# Patient Record
Sex: FEMALE | Race: WHITE | NOT HISPANIC OR LATINO | Employment: OTHER | URBAN - NONMETROPOLITAN AREA
[De-identification: names, ages, dates, MRNs, and addresses within clinical notes are randomized per-mention and may not be internally consistent; named-entity substitution may affect disease eponyms.]

---

## 2017-03-06 ENCOUNTER — COMMUNICATION - GICH (OUTPATIENT)
Dept: FAMILY MEDICINE | Facility: OTHER | Age: 82
End: 2017-03-06

## 2017-06-12 ENCOUNTER — COMMUNICATION - GICH (OUTPATIENT)
Dept: FAMILY MEDICINE | Facility: OTHER | Age: 82
End: 2017-06-12

## 2017-06-12 DIAGNOSIS — E78.5 HYPERLIPIDEMIA: ICD-10-CM

## 2017-06-13 ENCOUNTER — AMBULATORY - GICH (OUTPATIENT)
Dept: LAB | Facility: OTHER | Age: 82
End: 2017-06-13

## 2017-06-13 DIAGNOSIS — E78.5 HYPERLIPIDEMIA: ICD-10-CM

## 2017-06-13 LAB
A/G RATIO - HISTORICAL: 1.2 (ref 1–2)
ALBUMIN SERPL-MCNC: 4.1 G/DL (ref 3.5–5.7)
ALP SERPL-CCNC: 69 IU/L (ref 34–104)
ALT (SGPT) - HISTORICAL: 11 IU/L (ref 7–52)
ANION GAP - HISTORICAL: 8 (ref 5–18)
AST SERPL-CCNC: 18 IU/L (ref 13–39)
BILIRUB SERPL-MCNC: 0.6 MG/DL (ref 0.3–1)
BUN SERPL-MCNC: 17 MG/DL (ref 7–25)
BUN/CREAT RATIO - HISTORICAL: 20
CALCIUM SERPL-MCNC: 10 MG/DL (ref 8.6–10.3)
CHLORIDE SERPLBLD-SCNC: 100 MMOL/L (ref 98–107)
CHOL/HDL RATIO - HISTORICAL: 6.5
CHOLESTEROL TOTAL: 299 MG/DL
CO2 SERPL-SCNC: 27 MMOL/L (ref 21–31)
CREAT SERPL-MCNC: 0.85 MG/DL (ref 0.7–1.3)
GFR IF NOT AFRICAN AMERICAN - HISTORICAL: >60 ML/MIN/1.73M2
GLOBULIN - HISTORICAL: 3.4 G/DL (ref 2–3.7)
GLUCOSE SERPL-MCNC: 118 MG/DL (ref 70–105)
HDLC SERPL-MCNC: 46 MG/DL (ref 23–92)
LDLC SERPL CALC-MCNC: 224 MG/DL
NON-HDL CHOLESTEROL - HISTORICAL: 253 MG/DL
PATIENT STATUS - HISTORICAL: ABNORMAL
POTASSIUM SERPL-SCNC: 3.4 MMOL/L (ref 3.5–5.1)
PROT SERPL-MCNC: 7.5 G/DL (ref 6.4–8.9)
SODIUM SERPL-SCNC: 135 MMOL/L (ref 133–143)
TRIGL SERPL-MCNC: 147 MG/DL

## 2017-07-03 ENCOUNTER — COMMUNICATION - GICH (OUTPATIENT)
Dept: FAMILY MEDICINE | Facility: OTHER | Age: 82
End: 2017-07-03

## 2017-07-03 DIAGNOSIS — I10 ESSENTIAL (PRIMARY) HYPERTENSION: ICD-10-CM

## 2017-07-18 ENCOUNTER — COMMUNICATION - GICH (OUTPATIENT)
Dept: FAMILY MEDICINE | Facility: OTHER | Age: 82
End: 2017-07-18

## 2017-07-18 ENCOUNTER — OFFICE VISIT - GICH (OUTPATIENT)
Dept: FAMILY MEDICINE | Facility: OTHER | Age: 82
End: 2017-07-18

## 2017-07-18 ENCOUNTER — HISTORY (OUTPATIENT)
Dept: FAMILY MEDICINE | Facility: OTHER | Age: 82
End: 2017-07-18

## 2017-07-18 DIAGNOSIS — E78.5 HYPERLIPIDEMIA: ICD-10-CM

## 2017-07-18 DIAGNOSIS — I10 ESSENTIAL (PRIMARY) HYPERTENSION: ICD-10-CM

## 2017-09-28 ENCOUNTER — COMMUNICATION - GICH (OUTPATIENT)
Dept: FAMILY MEDICINE | Facility: OTHER | Age: 82
End: 2017-09-28

## 2017-09-28 DIAGNOSIS — E78.5 HYPERLIPIDEMIA: ICD-10-CM

## 2017-09-29 ENCOUNTER — AMBULATORY - GICH (OUTPATIENT)
Dept: LAB | Facility: OTHER | Age: 82
End: 2017-09-29

## 2017-09-29 DIAGNOSIS — E78.5 HYPERLIPIDEMIA: ICD-10-CM

## 2017-09-29 LAB
CHOL/HDL RATIO - HISTORICAL: 4.59
CHOLESTEROL TOTAL: 211 MG/DL
HDLC SERPL-MCNC: 46 MG/DL (ref 23–92)
LDLC SERPL CALC-MCNC: 134 MG/DL
NON-HDL CHOLESTEROL - HISTORICAL: 165 MG/DL
PROVIDER ORDERDED STATUS - HISTORICAL: ABNORMAL
TRIGL SERPL-MCNC: 155 MG/DL

## 2017-10-06 ENCOUNTER — OFFICE VISIT - GICH (OUTPATIENT)
Dept: FAMILY MEDICINE | Facility: OTHER | Age: 82
End: 2017-10-06

## 2017-10-06 ENCOUNTER — HISTORY (OUTPATIENT)
Dept: FAMILY MEDICINE | Facility: OTHER | Age: 82
End: 2017-10-06

## 2017-10-06 DIAGNOSIS — I10 ESSENTIAL (PRIMARY) HYPERTENSION: ICD-10-CM

## 2017-10-06 DIAGNOSIS — Z23 ENCOUNTER FOR IMMUNIZATION: ICD-10-CM

## 2017-12-28 NOTE — PROGRESS NOTES
Patient Information     Patient Name MRN Sex Leighann Hidalgo [id number] Female 1929      Progress Notes by Tomi Trimble MD at 2017  9:45 AM     Author:  Tomi Trimble MD Service:  (none) Author Type:  Physician     Filed:  2017  9:57 AM Encounter Date:  2017 Status:  Signed     :  Tomi Trimble MD (Physician)            SUBJECTIVE:  Leighann Girard is a 88 y.o. female here for follow-up. Patient has a history of hypertension which has been well-controlled. She recently had fasting labs done which were normal.    She also has a history of hyperlipidemia. She has not tolerated Lipitor in the past. She states that she has several family members that you simvastatin and he tolerated well. She would like to try that given their success. She's had no chest pain, short of breath or palpitations. She tries to exercise but has had difficult time recently due to the humidity. She otherwise has no new concerns.      Patient Active Problem List      Diagnosis Date Noted     ACP (advance care planning) 2013     LEG CRAMPS, NOCTURNAL 2011     OSTEOPENIA      HYPERTENSION      HYPERLIPIDEMIA      ALLERGIC RHINITIS      ANEMIA 2009       Past Medical History:     Diagnosis  Date     Encounter for monitoring estrogen replacement therapy following surgical menopause 1967    stopped estrogen replacement .      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  0      "Flaxseed Powd Take 1 tablespoon by mouth with breakfast.       fluticasone (50 mcg per actuation) nasal solution (FLONASE) USE TWO SPRAY(S) IN EACH NOSTRIL ONCE DAILY 3 Bottle 1     gemfibrozil (LOPID) 600 mg tablet Take 1 tablet by mouth 2 times daily before meals. 180 tablet 3     guaiFENesin (MUCINEX) 600 mg Extended-Release tablet One tab once or twice daily as needed for congestion and phlegm 20 tablet 0     losartan-hydrochlorothiazide, 50-12.5 mg, (HYZAAR) 50-12.5 mg tablet Take 1 tablet by mouth once daily. 90 tablet 3     multivitamin (MVI) tablet Take 1 tablet by mouth once daily.       OMEGA-3 FATTY ACIDS (FISH OIL CONCENTRATE ORAL) Take 1 capsule by mouth 2 times daily.       simvastatin (ZOCOR) 10 mg tablet Take 1 tablet by mouth at bedtime. 90 tablet 3     ZINC ORAL Take 1 capsule by mouth once daily.       No current facility-administered medications for this visit.      Medications have been reviewed by me and are current to the best of my knowledge and ability.      Allergies:  Allergies      Allergen   Reactions     Ace Inhibitors  Cough     Lescol [Fluvastatin]  Other - Describe In Comment Field     Elevated liver enzymes      Shrimp [Shellfish Containing Products]  Shortness Of Breath     Statins-Hmg-Coa Reductase Inhibitors  Myalgia     Tessalon [Benzonatate]  Nausea Only and Dizziness       Family History       Problem   Relation Age of Onset     Hyperlipidemia  Sister       Hyperlipidemia       Heart Disease  Sister      MI in her 60's, lifelong smoker       Stroke  Father 89     Heart Disease  Mother      Heart attack in her late 80's,          Social History     Substance Use Topics       Smoking status: Never Smoker     Smokeless tobacco: Never Used     Alcohol use No       ROS:    As above otherwise ROS is unremarkable.      OBJECTIVE:  /68  Pulse 72  Ht 1.6 m (5' 3\")  Wt 61.2 kg (135 lb)  BMI 23.91 kg/m2    EXAM:  General Appearance: Pleasant, alert, appropriate appearance " for age. No acute distress  Neck: Supple, no masses or nodes, no lymphadenopathy.  No thyromegaly.  Lungs: Normal chest wall and respirations. Clear to auscultation, no wheezes or crackles.  Cardiovascular: Regular rate and rhythm. S1, S2, no murmurs.  Musculoskeletal: No edema.  Skin: no concerning or new rashes.  Neurologic Exam: CN 2-12 grossly intact.  Normal gait.  Symmetric DTRs, No focal motor or sensory deficits. No tremor.  Psychiatric Exam: Alert and oriented, appropriate affect.    ASSESSEMENT AND PLAN:    Leighann was seen today for follow up.    Diagnoses and all orders for this visit:    Hyperlipidemia, unspecified hyperlipidemia type  -     simvastatin (ZOCOR) 10 mg tablet; Take 1 tablet by mouth at bedtime.    HYPERTENSION    HTN (hypertension)  -     losartan-hydrochlorothiazide, 50-12.5 mg, (HYZAAR) 50-12.5 mg tablet; Take 1 tablet by mouth once daily.    We'll start simvastatin 10 mg daily. She'll follow up sometime in October for fasting labs. Discussed potential side effects including myalgias once again.    We'll also continue her blood pressure regimen, recent fasting labs are reviewed and are normal. She'll follow-up in one year for reassessment of her blood pressure.      Lenny Trimble MD

## 2017-12-28 NOTE — PROGRESS NOTES
"Patient Information     Patient Name MRN Sex Leighann Hidalgo [id number] Female 1929      Progress Notes by Tomi Trimble MD at 10/6/2017  9:45 AM     Author:  Tomi Trimble MD Service:  (none) Author Type:  Physician     Filed:  10/6/2017 10:38 AM Encounter Date:  10/6/2017 Status:  Signed     :  Tomi Trimble MD (Physician)            SUBJECTIVE:  Leighann Girard is a 88 y.o. female here for follow-up. Patient has a history of hypertension and hyperlipidemia. We started simvastatin on her recently and she's been doing well with that. She has had no side effects. She recently had fasting lipids which have shown significant improvement. Her blood pressure has been stable. She is otherwise felt well with no exertional cardiovascular symptoms.    Allergies:  Allergies      Allergen   Reactions     Ace Inhibitors  Cough     Lescol [Fluvastatin]  Other - Describe In Comment Field     Elevated liver enzymes      Shrimp [Shellfish Containing Products]  Shortness Of Breath     Statins-Hmg-Coa Reductase Inhibitors  Myalgia     Tessalon [Benzonatate]  Nausea Only and Dizziness       ROS:    As above otherwise ROS is unremarkable.    OBJECTIVE:  /68  Pulse 69  Ht 1.6 m (5' 3\")  Wt 62.3 kg (137 lb 6.4 oz)  BMI 24.34 kg/m2    EXAM:  General Appearance: Pleasant, alert, appropriate appearance for age. No acute distress  Lungs: Normal chest wall and respirations. Clear to auscultation, no wheezes or crackles.  Cardiovascular: Regular rate and rhythm. S1, S2, no murmurs.  Musculoskeletal: No edema.    Results for orders placed or performed in visit on 17      LIPID PANEL      Result  Value Ref Range    CHOLESTEROL,TOTAL 211 (H) <200 mg/dL    TRIGLYCERIDES 155 (H) <150 mg/dL    HDL CHOLESTEROL 46 23 - 92 mg/dL    NON-HDL CHOLESTEROL 165 (H) <145 mg/dl    CHOL/HDL RATIO            4.59 (H) <4.50                    LDL CHOLESTEROL 134 (H) <100 mg/dL    PROVIDER ORDERED STATUS RANDOM         ASSESSEMENT " AND PLAN:    Leighann was seen today for medication management.    Diagnoses and all orders for this visit:    HTN (hypertension)  -     losartan-hydrochlorothiazide, 50-12.5 mg, (HYZAAR) 50-12.5 mg tablet; Take 1 tablet by mouth once daily.    Needs flu shot  -     FLU VACCINE => 65 YRS HIGH DOSE TRIVALENT IIV3 IM     blood pressure well controlled, continue current regimen. Reviewed lipids which are seen above. Overall these are much improved with simvastatin. She'll continue with fish oil to help with triglycerides as well as dietary changes. She'll follow-up in one year.      Lenny Trimble MD    This document was prepared using voice generated software.  While every attempt was made for accuracy, grammatical errors may exist.

## 2017-12-28 NOTE — TELEPHONE ENCOUNTER
Patient Information     Patient Name MRN Sex Leighann Hidalgo [id number] Female 1929      Telephone Encounter by Tomi Trimble MD at 2017  3:09 PM     Author:  Tomi Trimble MD Service:  (none) Author Type:  Physician     Filed:  2017  3:09 PM Encounter Date:  2017 Status:  Signed     :  Tomi Trimble MD (Physician)            Fasting lab ordered

## 2017-12-28 NOTE — TELEPHONE ENCOUNTER
Patient Information     Patient Name Leighann Caceres [id number] Female 1929      Telephone Encounter by Goldie Jade at 2017  2:34 PM     Author:  Goldie Jade  Service:  (none) Author Type:  (none)     Filed:  2017  4:42 PM  Encounter Date:  2017 Status:  Addendum     :  Goldie Jade        Related Notes: Original Note by Goldie Jade filed at 2017  2:35 PM            Patient would like to have an order to check her cholesterol.  Goldie Jade LPN .............2017  2:34 PM

## 2017-12-28 NOTE — TELEPHONE ENCOUNTER
Patient Information     Patient Name MRLeighann Gee [id number] Female 1929      Telephone Encounter by Goldie Jade at 2017  1:07 PM     Author:  Goldie Jade Service:  (none) Author Type:  (none)     Filed:  2017  1:07 PM Encounter Date:  2017 Status:  Signed     :  Goldie Jade            Pharmacy notified.  Goldie Jade LPN .............2017  1:07 PM

## 2017-12-28 NOTE — TELEPHONE ENCOUNTER
Patient Information     Patient Name Leighann Caceres [id number] Female 1929      Telephone Encounter by Goldie Jade at 2017 11:50 AM     Author:  Goldie Jade Service:  (none) Author Type:  (none)     Filed:  2017 11:54 AM Encounter Date:  2017 Status:  Signed     :  Goldie Jade            Spoke with pharmacy and they need to verify that simvastatin is okay to prescribe the patient due to her statin allergy. Pharmacy states they just need verification.  Goldie Jade LPN .............2017  11:54 AM

## 2017-12-28 NOTE — TELEPHONE ENCOUNTER
Patient Information     Patient Name MRN Sex Leighann Hidalgo [id number] Female 1929      Telephone Encounter by Tomi Trimble MD at 2017 12:16 PM     Author:  Tomi Trimble MD Service:  (none) Author Type:  Physician     Filed:  2017 12:16 PM Encounter Date:  2017 Status:  Signed     :  Tomi Trimble MD (Physician)            Yes, the patient requested to try simvastatin as the rest of her family takes it without any problems.

## 2017-12-28 NOTE — TELEPHONE ENCOUNTER
Patient Information     Patient Name MRN Sex Leighann Hidalgo [id number] Female 1929      Telephone Encounter by Tomi Trimble MD at 2017  2:40 PM     Author:  Tomi Trimble MD Service:  (none) Author Type:  Physician     Filed:  2017  2:41 PM Encounter Date:  2017 Status:  Signed     :  Tomi Trimble MD (Physician)            Fasting labs ordered.

## 2017-12-28 NOTE — TELEPHONE ENCOUNTER
Patient Information     Patient Name MRN Leighann Bright [id number] Female 1929      Telephone Encounter by Roland Lee RN at 2017  2:47 PM     Author:  Roland Lee RN Service:  (none) Author Type:  NURS- Registered Nurse     Filed:  2017  2:55 PM Encounter Date:  7/3/2017 Status:  Signed     :  Roland Lee RN (NURS- Registered Nurse)            Diuretic Combinations    Office visit in the past 12 months or per provider note.    Last visit with KERRY LÓPEZ was on: 2016 in [facility]  Next visit with KERRY LÓPEZ is on: No future appointment listed with this provider  Next visit with Family Practice is on: No future appointment listed in this department    Lab test requirements:  Creatinine and Potassium annually, if ordering lab, order BMP.  CREATININE (mg/dL)    Date Value   2017 0.85     POTASSIUM (mmol/L)    Date Value   2017 3.4 (L)     Max refill for 12 months from last office visit or per provider note.    Chart review shows that last office visit with PCP to discuss diagnosis of hypertension was on 9/10/15 for an annual physical. Hyzaar as requested from pharmacy was continued at that time as per office visit notes on that date. Patient with labwork as noted above completed recently to support continued use of requested rx, but hasn't seen PCP in last 12 months. Writer will refill rx as requested for a limited supply, and send patient a reminder letter that she should be seen for an annual office visit with PCP.    Prescription refilled per RN Medication Refill Policy.................... Roland Lee RN ....................  2017   2:51 PM

## 2017-12-28 NOTE — TELEPHONE ENCOUNTER
Patient Information     Patient Name MRN Leighann Bright [id number] Female 1929      Telephone Encounter by Goldie Jade at 2017  3:29 PM     Author:  Goldie Jade Service:  (none) Author Type:  (none)     Filed:  2017  3:30 PM Encounter Date:  2017 Status:  Signed     :  Goldie Jade            Patient notified and transferred to scheduling line.  Goldie Jade LPN .............2017  3:30 PM

## 2017-12-28 NOTE — TELEPHONE ENCOUNTER
Patient Information     Patient Name MRN Leighann Bright [id number] Female 1929      Telephone Encounter by Goldie Jade at 2017  4:43 PM     Author:  Goldie Jade Service:  (none) Author Type:  (none)     Filed:  2017  4:44 PM Encounter Date:  2017 Status:  Signed     :  Goldie Jade            Patient notified, transferred to appointment line to schedule.  Goldie Jade LPN .............2017  4:43 PM

## 2017-12-30 NOTE — NURSING NOTE
Patient Information     Patient Name MRLeighann Gee [id number] Female 1929      Nursing Note by Goldie Jade at 10/6/2017  9:45 AM     Author:  Goldie Jade Service:  (none) Author Type:  (none)     Filed:  10/6/2017 10:09 AM Encounter Date:  10/6/2017 Status:  Signed     :  Goldie Jade            Patient presents today for medication management. Patient states she has no concerns and is only her for her blood pressure medications.  Goldie Jade LPN .............10/6/2017  10:03 AM

## 2017-12-30 NOTE — NURSING NOTE
Patient Information     Patient Name Leighann Caceres [id number] Female 1929      Nursing Note by Goldie Jade at 2017  9:45 AM     Author:  Goldie Jade Service:  (none) Author Type:  (none)     Filed:  2017  9:46 AM Encounter Date:  2017 Status:  Signed     :  Goldie Jade            Patient presents today to follow up on her cholesterol. Patient also states she has other issues to address; patient stopped taking her Lipitor due to muscle weakness which alleviated those symptoms; patient received a letter stating she needs to discuss her BP medication before receiving another refill.  Goldie Jade LPN .............2017  9:41 AM

## 2018-01-03 NOTE — TELEPHONE ENCOUNTER
"Patient Information     Patient Name MRN Leighann Bright [id number] Female 1929      Telephone Encounter by Lucina Belcher at 3/6/2017  2:53 PM     Author:  Lucina Belcher Service:  (none) Author Type:  (none)     Filed:  3/6/2017  2:57 PM Encounter Date:  3/6/2017 Status:  Signed     :  Lucina Belcher            Patient requesting an Rx for a cane. States she \"doesn't 'need' one but does go out for walks and thinks that it would be a good idea. Informed for medicare to pay for DME she would need to be seen. States that she will price them out of pocket and if does not want to pay out of pocket will make an appointment to discuss.  Lucina Belcher LPN   3/6/2017  2:57 PM           "

## 2018-01-18 PROBLEM — J30.9 ALLERGIC RHINITIS: Status: ACTIVE | Noted: 2018-01-18

## 2018-01-18 PROBLEM — M94.9 DISORDER OF BONE AND CARTILAGE: Status: ACTIVE | Noted: 2018-01-18

## 2018-01-18 PROBLEM — I10 HYPERTENSION: Status: ACTIVE | Noted: 2018-01-18

## 2018-01-18 PROBLEM — E78.5 HYPERLIPIDEMIA: Status: ACTIVE | Noted: 2018-01-18

## 2018-01-18 PROBLEM — M89.9 DISORDER OF BONE AND CARTILAGE: Status: ACTIVE | Noted: 2018-01-18

## 2018-01-18 RX ORDER — DIPHENOXYLATE HYDROCHLORIDE AND ATROPINE SULFATE 2.5; .025 MG/1; MG/1
TABLET ORAL
COMMUNITY
End: 2019-09-09

## 2018-01-18 RX ORDER — ACETAMINOPHEN AND DIPHENHYDRAMINE HCL 500; 25 MG/1; MG/1
TABLET, FILM COATED ORAL
COMMUNITY
End: 2021-11-08

## 2018-01-18 RX ORDER — FLUTICASONE PROPIONATE 50 MCG
SPRAY, SUSPENSION (ML) NASAL
COMMUNITY
Start: 2016-04-26 | End: 2018-11-09

## 2018-01-18 RX ORDER — CHLORAL HYDRATE 500 MG
CAPSULE ORAL
COMMUNITY
End: 2021-11-08

## 2018-01-18 RX ORDER — SIMVASTATIN 10 MG
TABLET ORAL
COMMUNITY
Start: 2017-07-18 | End: 2018-08-30

## 2018-01-18 RX ORDER — LOSARTAN POTASSIUM AND HYDROCHLOROTHIAZIDE 12.5; 5 MG/1; MG/1
TABLET ORAL
COMMUNITY
Start: 2017-10-06 | End: 2018-10-12

## 2018-01-26 VITALS
HEIGHT: 63 IN | HEART RATE: 72 BPM | SYSTOLIC BLOOD PRESSURE: 132 MMHG | DIASTOLIC BLOOD PRESSURE: 68 MMHG | WEIGHT: 135 LBS | BODY MASS INDEX: 23.92 KG/M2

## 2018-01-26 VITALS
HEART RATE: 69 BPM | WEIGHT: 137.4 LBS | HEIGHT: 63 IN | BODY MASS INDEX: 24.34 KG/M2 | DIASTOLIC BLOOD PRESSURE: 68 MMHG | SYSTOLIC BLOOD PRESSURE: 123 MMHG

## 2018-02-02 ENCOUNTER — HISTORY (OUTPATIENT)
Dept: FAMILY MEDICINE | Facility: OTHER | Age: 83
End: 2018-02-02

## 2018-02-02 ENCOUNTER — OFFICE VISIT - GICH (OUTPATIENT)
Dept: FAMILY MEDICINE | Facility: OTHER | Age: 83
End: 2018-02-02

## 2018-02-02 DIAGNOSIS — R06.01 ORTHOPNEA: ICD-10-CM

## 2018-02-02 DIAGNOSIS — R06.00 DYSPNEA: ICD-10-CM

## 2018-02-02 LAB
A/G RATIO - HISTORICAL: 1.4 (ref 1–2)
ALBUMIN SERPL-MCNC: 4.4 G/DL (ref 3.5–5.7)
ALP SERPL-CCNC: 58 IU/L (ref 34–104)
ALT (SGPT) - HISTORICAL: 19 IU/L (ref 7–52)
ANION GAP - HISTORICAL: 7 (ref 5–18)
AST SERPL-CCNC: 25 IU/L (ref 13–39)
BILIRUB SERPL-MCNC: 0.5 MG/DL (ref 0.3–1)
BNP SERPL-MCNC: 33 PG/ML
BUN SERPL-MCNC: 17 MG/DL (ref 7–25)
BUN/CREAT RATIO - HISTORICAL: 20
CALCIUM SERPL-MCNC: 10 MG/DL (ref 8.6–10.3)
CHLORIDE SERPLBLD-SCNC: 101 MMOL/L (ref 98–107)
CO2 SERPL-SCNC: 32 MMOL/L (ref 21–31)
CREAT SERPL-MCNC: 0.86 MG/DL (ref 0.7–1.3)
GFR IF NOT AFRICAN AMERICAN - HISTORICAL: >60 ML/MIN/1.73M2
GLOBULIN - HISTORICAL: 3.2 G/DL (ref 2–3.7)
GLUCOSE SERPL-MCNC: 137 MG/DL (ref 70–105)
POTASSIUM SERPL-SCNC: 3.4 MMOL/L (ref 3.5–5.1)
PROT SERPL-MCNC: 7.6 G/DL (ref 6.4–8.9)
SODIUM SERPL-SCNC: 140 MMOL/L (ref 133–143)

## 2018-02-06 ENCOUNTER — TRANSFERRED RECORDS (OUTPATIENT)
Dept: HEALTH INFORMATION MANAGEMENT | Facility: CLINIC | Age: 83
End: 2018-02-06

## 2018-02-06 ENCOUNTER — MEDICAL CORRESPONDENCE (OUTPATIENT)
Facility: CLINIC | Age: 83
End: 2018-02-06
Payer: COMMERCIAL

## 2018-02-06 ENCOUNTER — HOSPITAL ENCOUNTER (OUTPATIENT)
Dept: RADIOLOGY | Facility: OTHER | Age: 83
End: 2018-02-06
Attending: FAMILY MEDICINE

## 2018-02-06 DIAGNOSIS — R06.00 DYSPNEA: ICD-10-CM

## 2018-02-06 DIAGNOSIS — R06.01 ORTHOPNEA: ICD-10-CM

## 2018-02-06 PROCEDURE — 93306 TTE W/DOPPLER COMPLETE: CPT | Mod: 26 | Performed by: INTERNAL MEDICINE

## 2018-02-09 ENCOUNTER — OFFICE VISIT - GICH (OUTPATIENT)
Dept: FAMILY MEDICINE | Facility: OTHER | Age: 83
End: 2018-02-09

## 2018-02-09 ENCOUNTER — HISTORY (OUTPATIENT)
Dept: FAMILY MEDICINE | Facility: OTHER | Age: 83
End: 2018-02-09

## 2018-02-09 VITALS
HEART RATE: 101 BPM | BODY MASS INDEX: 23.91 KG/M2 | WEIGHT: 135 LBS | SYSTOLIC BLOOD PRESSURE: 123 MMHG | DIASTOLIC BLOOD PRESSURE: 72 MMHG

## 2018-02-09 DIAGNOSIS — R06.01 ORTHOPNEA: ICD-10-CM

## 2018-02-12 VITALS
BODY MASS INDEX: 23.91 KG/M2 | DIASTOLIC BLOOD PRESSURE: 78 MMHG | WEIGHT: 135 LBS | HEART RATE: 96 BPM | SYSTOLIC BLOOD PRESSURE: 126 MMHG

## 2018-02-13 NOTE — PROGRESS NOTES
Patient Information     Patient Name MRN Sex Leighann Hidalgo [id number] Female 1929      Progress Notes by Tomi Trimble MD at 2018  2:30 PM     Author:  Tomi Trimble MD Service:  (none) Author Type:  Physician     Filed:  2018  2:49 PM Encounter Date:  2018 Status:  Signed     :  Tomi Trimble MD (Physician)            SUBJECTIVE:  Leighann Girard is a 89 y.o. female here for difficulty sleeping. She states that over the last couple of weeks she has had increasing shortness of breath when she lays down. Because of that she's had to sleep with 2 or 3 pillows which is making it difficult to sleep. She does not have any difficulty during the day. She is able to nap during the day while she is in a chair seated. She does notsignificant water retention or edema in her lower extremity. No change in salt intake. No chest pain or palpitations. No change in medications.    Allergies:  Allergies      Allergen   Reactions     Ace Inhibitors  Cough     Lescol [Fluvastatin]  Other - Describe In Comment Field     Elevated liver enzymes      Shrimp [Shellfish Containing Products]  Shortness Of Breath     Statins-Hmg-Coa Reductase Inhibitors  Myalgia     Tessalon [Benzonatate]  Nausea Only and Dizziness       ROS:    As above otherwise ROS is unremarkable.    OBJECTIVE:  /72 (Cuff Site: Right Arm, Position: Sitting, Cuff Size: Adult Regular)  Pulse (!) 101  Wt 61.2 kg (135 lb)  BMI 23.91 kg/m2    EXAM:  General Appearance: Pleasant, alert, appropriate appearance for age. No acute distress  Lungs: Normal chest wall and respirations. Clear to auscultation, no wheezes or crackles.  Cardiovascular: Regular rate and rhythm. S1, S2, no murmurs.  Musculoskeletal: No edema.    ASSESSEMENT AND PLAN:    Leighann was seen today for sleep problem.    Diagnoses and all orders for this visit:    Orthopnea  -     COMPLETE METABOLIC PANEL; Future  -     BNP; Future  -     CBC WITH DIFFERENTIAL; Future  -     ECHO  COMPLETE WO CONTRAST; Future  -     EKG 12 LEAD UNIT PERFORMED (PERFORMED TODAY)    Dyspnea, unspecified type   -     ECHO COMPLETE WO CONTRAST; Future  -     EKG 12 LEAD UNIT PERFORMED (PERFORMED TODAY)     EKG is performed, personally reviewed and shows sinus rhythm with inferior changes previously seen. She also has a first-degree AV block.  No other acute abnormalities.  We'll get an echocardiogram to evaluate for any signs of heart failure, basic metabolic panel, BNP and CBC. She'll follow-up several days after echocardiogram to review all of these results. Discussed trying to minimize salt in her diet as best she can.      Lenny Trimble MD    This document was prepared using voice generated software.  While every attempt was made for accuracy, grammatical errors may exist.

## 2018-02-13 NOTE — NURSING NOTE
Patient Information     Patient Name MRN Leighann Bright [id number] Female 1929      Nursing Note by Goldie Jade at 2018  4:00 PM     Author:  Goldie Jade Service:  (none) Author Type:  (none)     Filed:  2018  4:22 PM Encounter Date:  2018 Status:  Signed     :  Goldie Jade            Patient presents today to review her recent ECHO and labs.  Goldie Jade LPN .............2018  4:09 PM

## 2018-02-13 NOTE — PROGRESS NOTES
Patient Information     Patient Name MRN Sex Leighann Hidalgo [id number] Female 1929      Progress Notes by Tomi Trimble MD at 2018  4:00 PM     Author:  Tomi Trimble MD Service:  (none) Author Type:  Physician     Filed:  2018  4:31 PM Encounter Date:  2018 Status:  Signed     :  Tomi Trimble MD (Physician)            SUBJECTIVE:  Leighann Girard is a 89 y.o. female here for follow-up. She was seen last week for orthopnea. At that time she is requiring 2 pillows to sleep at night. Since that time she tried some antihistamines and thinks that that is helping quite a bit. She's been able to sleep with only 1 pill at bedtime. Because of her symptoms she had an echocardiogram, labs and EKG performed. She comes in today for follow-up of those.    Allergies:  Allergies      Allergen   Reactions     Ace Inhibitors  Cough     Lescol [Fluvastatin]  Other - Describe In Comment Field     Elevated liver enzymes      Shrimp [Shellfish Containing Products]  Shortness Of Breath     Statins-Hmg-Coa Reductase Inhibitors  Myalgia     Tessalon [Benzonatate]  Nausea Only and Dizziness       ROS:    As above otherwise ROS is unremarkable.    OBJECTIVE:  /78 (Cuff Site: Right Arm, Position: Sitting, Cuff Size: Adult Regular)  Pulse 96  Wt 61.2 kg (135 lb)  BMI 23.91 kg/m2    EXAM:  General Appearance: Pleasant, alert, appropriate appearance for age. No acute distress      ASSESSEMENT AND PLAN:    Leighann was seen today for follow up.    Diagnoses and all orders for this visit:    Orthopnea     we reviewed her echocardiogram results which shows mild diastolic dysfunction otherwise unremarkable. Her labs are normal including normal BNP. Since she does have some improvement with that is to be the recommended she continue with that. She has worsening symptoms we could consider a small iritic to see if that would be beneficial but we will hold off at this time. Both the patient and her son were comfortable  with this plan and she will follow-up as needed.      Lenny Trimble MD    This document was prepared using voice generated software.  While every attempt was made for accuracy, grammatical errors may exist.

## 2018-02-13 NOTE — NURSING NOTE
"Patient Information     Patient Name Leighann Caceres [id number] Female 1929      Nursing Note by Goldie Jade at 2018  2:30 PM     Author:  Goldie Jade Service:  (none) Author Type:  (none)     Filed:  2018  2:34 PM Encounter Date:  2018 Status:  Signed     :  Goldie Jade            Patient presents today with some sleep concerns. She states that she has been having some difficulty catching her breath and it worsens at night to the point of where she can't sleep at night. She states she has no issues sitting down during the day and falling asleep, but at night \"it's a different story.\"  Goldie Jade LPN .............2018  2:21 PM          "

## 2018-04-24 ENCOUNTER — TELEPHONE (OUTPATIENT)
Dept: FAMILY MEDICINE | Facility: OTHER | Age: 83
End: 2018-04-24

## 2018-04-24 DIAGNOSIS — E78.00 PURE HYPERCHOLESTEROLEMIA: Primary | ICD-10-CM

## 2018-04-24 NOTE — TELEPHONE ENCOUNTER
After birth date was verified, spoke with Leighann. She stated that she would like to have her cholesterol and triglycerides rechecked. States that she has been taking Simvastatin since July and has not had follow up labs.     If appropriate, please order labs and patient will schedule a lab only appointment.        Ben Orozco LPN 04/24/18 3:06 PM

## 2018-04-25 NOTE — TELEPHONE ENCOUNTER
Called patient with results after giving last name and date of birth.  Leif Robertson ..............4/25/2018 2:25 PM

## 2018-04-27 DIAGNOSIS — E78.00 PURE HYPERCHOLESTEROLEMIA: ICD-10-CM

## 2018-04-27 LAB
CHOLEST SERPL-MCNC: 187 MG/DL
HDLC SERPL-MCNC: 43 MG/DL (ref 23–92)
LDLC SERPL CALC-MCNC: 114 MG/DL
NONHDLC SERPL-MCNC: 144 MG/DL
TRIGL SERPL-MCNC: 151 MG/DL

## 2018-04-27 PROCEDURE — 36415 COLL VENOUS BLD VENIPUNCTURE: CPT | Performed by: FAMILY MEDICINE

## 2018-04-27 PROCEDURE — 80061 LIPID PANEL: CPT | Performed by: FAMILY MEDICINE

## 2018-04-30 ENCOUNTER — OFFICE VISIT (OUTPATIENT)
Dept: FAMILY MEDICINE | Facility: OTHER | Age: 83
End: 2018-04-30
Attending: FAMILY MEDICINE
Payer: COMMERCIAL

## 2018-04-30 VITALS
BODY MASS INDEX: 24.84 KG/M2 | SYSTOLIC BLOOD PRESSURE: 132 MMHG | DIASTOLIC BLOOD PRESSURE: 62 MMHG | HEART RATE: 80 BPM | WEIGHT: 140.2 LBS

## 2018-04-30 DIAGNOSIS — S86.911A STRAIN OF RIGHT KNEE AND LEG, INITIAL ENCOUNTER: Primary | ICD-10-CM

## 2018-04-30 PROCEDURE — G0463 HOSPITAL OUTPT CLINIC VISIT: HCPCS

## 2018-04-30 PROCEDURE — 99213 OFFICE O/P EST LOW 20 MIN: CPT | Performed by: FAMILY MEDICINE

## 2018-04-30 ASSESSMENT — PAIN SCALES - GENERAL: PAINLEVEL: SEVERE PAIN (7)

## 2018-04-30 NOTE — NURSING NOTE
Patient here for on going right thigh pain. Has had this in the past. Not sure what causing it it currently   Heather Dang LPN ..........4/30/2018 2:30 PM

## 2018-04-30 NOTE — MR AVS SNAPSHOT
"              After Visit Summary   2018    Leighann Girard    MRN: [id number]           Patient Information     Date Of Birth          1929        Visit Information        Provider Department      2018 2:45 PM Yessica Meehan MD [facility]        Today's Diagnoses     Strain of right knee and leg, initial encounter    -  1       Follow-ups after your visit        Who to contact     If you have questions or need follow up information about today's clinic visit or your schedule please contact [facility] directly at [phone].  Normal or non-critical lab and imaging results will be communicated to you by PeerJhart, letter or phone within 4 business days after the clinic has received the results. If you do not hear from us within 7 days, please contact the clinic through [employer] or phone. If you have a critical or abnormal lab result, we will notify you by phone as soon as possible.  Submit refill requests through [employer] or call your pharmacy and they will forward the refill request to us. Please allow 3 business days for your refill to be completed.          Additional Information About Your Visit        MyChart Information     [employer] lets you send messages to your doctor, view your test results, renew your prescriptions, schedule appointments and more. To sign up, go to [email or web address] . Click on \"Log in\" on the left side of the screen, which will take you to the Welcome page. Then click on \"Sign up Now\" on the right side of the page.     You will be asked to enter the access code listed below, as well as some personal information. Please follow the directions to create your username and password.     Your access code is: [email or web address]  Expires: 2018  7:24 PM     Your access code will  in 90 days. If you need help or a new code, please call your [place] clinic or [phone].        Care EveryWhere ID     This is your Care EveryWhere ID. This " could be used by other organizations to access your [place] medical records  [id number]        Your Vitals Were     Pulse BMI (Body Mass Index)                80 24.84 kg/m2           Blood Pressure from Last 3 Encounters:   04/30/18 132/62   02/09/18 126/78   02/02/18 123/72    Weight from Last 3 Encounters:   04/30/18 140 lb 3.2 oz (63.6 kg)   02/09/18 135 lb (61.2 kg)   02/02/18 135 lb (61.2 kg)              Today, you had the following     No orders found for display       Primary Care Provider Office Phone # Fax #    Tomi Trimble [phone] [address] [facility] [address]        Equal Access to Services     NANCY CARLIN : Marcel gómez Soyanni, waaxda luqadaha, qaybta kaalmada adeargentinayadaisy, karley de la paz . So [facility] [phone].    ATENCIÓN: Si habla español, tiene a zeng disposición servicios gratuitos de asistencia lingüística. Llame al [phone].    We comply with applicable federal civil rights laws and [place] laws. We do not discriminate on the basis of race, color, national origin, age, disability, sex, sexual orientation, or gender identity.            Thank you!     Thank you for choosing [facility]  for your care. Our goal is always to provide you with excellent care. Hearing back from our patients is one way we can continue to improve our services. Please take a few minutes to complete the written survey that you may receive in the mail after your visit with us. Thank you!             Your Updated Medication List - Protect others around you: Learn how to safely use, store and throw away your medicines at www.disposemymeds.org.          This list is accurate as of 4/30/18  7:24 PM.  Always use your most recent med list.                   Brand Name Dispense Instructions for use Diagnosis    Calcium Citrate-Vitamin D 500-500 MG-UNT/5GM Powd           EQL NATURAL ZINC 50 MG Tabs           fluticasone 50  MCG/ACT spray    FLONASE          losartan-hydrochlorothiazide 50-12.5 MG per tablet    HYZAAR          MULTI-VITAMINS Tabs           Omega-3 1000 MG Caps           simvastatin 10 MG tablet    ZOCOR

## 2018-05-01 NOTE — PROGRESS NOTES
SUBJECTIVE:   Leighann Girard is a 89 year old female who presents to clinic today for the following health issues:    HPI Comments: Leighann is an 89-year-old woman who presents today with approximately 5 days of right medial thigh pain.  No known injury.  Symptoms do not bother her when she is sitting or lying, or even when she is standing.  But when she tries to walk she has pain in the area of the right mid medial thigh.  No bruising in this area.  She has tried some Tylenol without relief.  She has a history of sciatica/hip pain with radiation down into her feet, but this last bothered her about 10 years ago.  She is very active, typically walks around her apartment building.  She no longer drives, but navigates the local bus system.       Patient Active Problem List    Diagnosis Date Noted     Allergic rhinitis 01/18/2018     Priority: Medium     Hyperlipidemia 01/18/2018     Priority: Medium     Hypertension 01/18/2018     Priority: Medium     Disorder of bone and cartilage 01/18/2018     Priority: Medium     ACP (advance care planning) 12/12/2013     Priority: Medium     Cramp of limb 08/08/2011     Priority: Medium     Anemia 04/23/2009     Priority: Medium         Review of Systems see HPI, review of systems is otherwise negative.    OBJECTIVE:     /62 (BP Location: Right arm, Patient Position: Sitting, Cuff Size: Adult Regular)  Pulse 80  Wt 140 lb 3.2 oz (63.6 kg)  BMI 24.84 kg/m2  Body mass index is 24.84 kg/(m^2).  Physical Exam   Constitutional: She appears well-developed and well-nourished.   HENT:   Right Ear: External ear normal.   Left Ear: External ear normal.   Eyes: Conjunctivae are normal. No scleral icterus.   Cardiovascular: Normal rate.    Pulmonary/Chest: Effort normal. No respiratory distress.   Musculoskeletal: She exhibits no edema.   No swelling of the lower extremities.  No pain with deep palpation of the right upper leg.  No pain with internal or external rotation of the hip.   Normal strength.   Neurological: She is alert.   Skin: No rash noted.   Psychiatric: She has a normal mood and affect.     ASSESSMENT/PLAN:         ICD-10-CM    1. Strain of right knee and leg, initial encounter S86.911A      Suspect muscle or ligament strain, no evidence of bone pathology necessitating ridging via x-ray.  Would recommend proceeding with conservative management.  Patient has not found Tylenol to be helpful.  Previously with normal creatinine.  Therefore, did recommend a short course of ibuprofen 400 mg every 6 hours for the next 2-3 days.  She will only use this when wanting to be active, as symptoms do not bother her when she is at rest.  Did review risks associated with this medication.  She can try ice and/or heat to see if this relieves any symptoms.  Did recommend overall decreased activity while allowing for healing.  If no improvement in symptoms, would recommend physical therapy.  Follow-up with Dr. Trimble for further evaluation if symptoms persist.    Yessica Meehan MD  [facility]

## 2018-06-01 ENCOUNTER — TELEPHONE (OUTPATIENT)
Dept: FAMILY MEDICINE | Facility: OTHER | Age: 83
End: 2018-06-01

## 2018-06-01 DIAGNOSIS — H91.93 DECREASED HEARING OF BOTH EARS: Primary | ICD-10-CM

## 2018-06-09 ENCOUNTER — TRANSFERRED RECORDS (OUTPATIENT)
Dept: HEALTH INFORMATION MANAGEMENT | Facility: OTHER | Age: 83
End: 2018-06-09

## 2018-07-23 NOTE — PROGRESS NOTES
Patient Information     Patient Name  Leighann Girard MRN  [id number] Sex  Female   1929      Letter by Tomi Trimble MD at      Author:  Tomi Trimble MD Service:  (none) Author Type:  (none)    Filed:   Encounter Date:  2017 Status:  (Other)           Leighann Girard  [address]  [address]  [facility] [address]          2017    Dear Ms. Girard:    Your recent lab values can be seen below.     Your cholesterol panel continues to be elevated. Generally we would use cholesterol-lowering medication however you have had muscle aches from these in the past. Therefore I would recommend that you continue to try to moderate your diet.    Your fasting glucose, which is a test for diabetes, came back acceptable. Your liver and kidney tests also came back normal.    If you have any questions, do not hesitate to contact me.    Results for orders placed or performed in visit on 17      COMPLETE METABOLIC PANEL      Result  Value Ref Range    SODIUM 135 133 - 143 mmol/L    POTASSIUM 3.4 (L) 3.5 - 5.1 mmol/L    CHLORIDE 100 98 - 107 mmol/L    CO2,TOTAL 27 21 - 31 mmol/L    ANION GAP 8 5 - 18                    GLUCOSE 118 (H) 70 - 105 mg/dL    CALCIUM 10.0 8.6 - 10.3 mg/dL    BUN 17 7 - 25 mg/dL    CREATININE 0.85 0.70 - 1.30 mg/dL    BUN/CREAT RATIO           20                    GFR if African American >60 >60 ml/min/1.73m2    GFR if not African American >60 >60 ml/min/1.73m2    ALBUMIN 4.1 3.5 - 5.7 g/dL    PROTEIN,TOTAL 7.5 6.4 - 8.9 g/dL    GLOBULIN                  3.4 2.0 - 3.7 g/dL    A/G RATIO 1.2 1.0 - 2.0                    BILIRUBIN,TOTAL 0.6 0.3 - 1.0 mg/dL    ALK PHOSPHATASE 69 34 - 104 IU/L    ALT (SGPT) 11 7 - 52 IU/L    AST (SGOT) 18 13 - 39 IU/L   LIPID PANEL      Result  Value Ref Range    CHOLESTEROL,TOTAL 299 (H) <200 mg/dL    TRIGLYCERIDES 147 <150 mg/dL    HDL CHOLESTEROL 46 23 - 92 mg/dL    NON-HDL CHOLESTEROL 253 (H) <145 mg/dl    CHOL/HDL RATIO            6.50 (H) <4.50                     LDL CHOLESTEROL 224 (H) <100 mg/dL    PATIENT STATUS            FASTING                         Sincerely,        Lenny Trimble MD  Family Medicine

## 2018-07-23 NOTE — PROGRESS NOTES
Patient Information     Patient Name  Leighann Girard MRN  [id number] Sex  Female   1929      Letter by Tomi Trimble MD at      Author:  Tomi Trimble MD Service:  (none) Author Type:  (none)    Filed:   Encounter Date:  2017 Status:  (Other)           Leighann Girard  [address]  [address]  [place] [address]          2017    Dear Ms. Girard:    Your recent lab values can be seen below.     Your cholesterol panel shows significant improvement now that you have started simvastatin. I would recommend that you continue with this medication though you check this again in one year.    If you have any questions, do not hesitate to contact me.    Results for orders placed or performed in visit on 17      LIPID PANEL      Result  Value Ref Range    CHOLESTEROL,TOTAL 211 (H) <200 mg/dL    TRIGLYCERIDES 155 (H) <150 mg/dL    HDL CHOLESTEROL 46 23 - 92 mg/dL    NON-HDL CHOLESTEROL 165 (H) <145 mg/dl    CHOL/HDL RATIO            4.59 (H) <4.50                    LDL CHOLESTEROL 134 (H) <100 mg/dL    PROVIDER ORDERED STATUS RANDOM          Sincerely,        Lenny Trimble MD  Family Medicine

## 2018-07-24 NOTE — PROGRESS NOTES
Patient Information     Patient Name  Leighann Girard MRN  [id number] Sex  Female   1929      Letter by Tomi Trimble MD at      Author:  Tomi Trimble MD Service:  (none) Author Type:  (none)    Filed:   Encounter Date:  7/3/2017 Status:  (Other)           Leighann Girard  [address]  [address]  [facility] [place] [address]          2017    Dear Ms. Girard:    This is to remind you that you are due for your annual medication management appointment with Tomi Trimble MD in relation to diagnosis of Hypertension as well as continued use of hyzaar. Your last visit was on 9/10/15. Additional refills of your medication require you to complete this visit.    Please call [phone] to schedule your appointment.    Thank you for choosing [facility] for your health care needs.    Sincerely,      Refill RN  [facility]

## 2018-07-24 NOTE — PROGRESS NOTES
Patient Information     Patient Name  Leighann Girard MRN  [id number] Sex  Female   1929      Letter by Tomi Trimble MD at      Author:  Tomi Trimble MD Service:  (none) Author Type:  (none)    Filed:   Encounter Date:  2018 Status:  (Other)           Leighann Girard  [address] [address]          2018    Dear Ms. Girard:    Your recent lab values can be seen below.     Your kidney function, electrolytes and testing for heart failure all came back normal. This is reassuring. We will discuss further at your follow-up visit on .    If you have any questions, do not hesitate to contact me.    Results for orders placed or performed in visit on 18      COMPLETE METABOLIC PANEL      Result  Value Ref Range    SODIUM 140 133 - 143 mmol/L    POTASSIUM 3.4 (L) 3.5 - 5.1 mmol/L    CHLORIDE 101 98 - 107 mmol/L    CO2,TOTAL 32 (H) 21 - 31 mmol/L    ANION GAP 7 5 - 18                    GLUCOSE 137 (H) 70 - 105 mg/dL    CALCIUM 10.0 8.6 - 10.3 mg/dL    BUN 17 7 - 25 mg/dL    CREATININE 0.86 0.70 - 1.30 mg/dL    BUN/CREAT RATIO           20                    GFR if African American >60 >60 ml/min/1.73m2    GFR if not African American >60 >60 ml/min/1.73m2    ALBUMIN 4.4 3.5 - 5.7 g/dL    PROTEIN,TOTAL 7.6 6.4 - 8.9 g/dL    GLOBULIN                  3.2 2.0 - 3.7 g/dL    A/G RATIO 1.4 1.0 - 2.0                    BILIRUBIN,TOTAL 0.5 0.3 - 1.0 mg/dL    ALK PHOSPHATASE 58 34 - 104 IU/L    ALT (SGPT) 19 7 - 52 IU/L    AST (SGOT) 25 13 - 39 IU/L   BNP      Result  Value Ref Range    BRAIN RODRIGUE PEPTIDE GIH 33 <100 pg/mL         Sincerely,        Lenny Trimble MD  Family Medicine

## 2018-08-30 DIAGNOSIS — E78.00 PURE HYPERCHOLESTEROLEMIA: Primary | ICD-10-CM

## 2018-08-31 RX ORDER — SIMVASTATIN 10 MG
TABLET ORAL
Qty: 90 TABLET | Refills: 3 | Status: SHIPPED | OUTPATIENT
Start: 2018-08-31 | End: 2018-11-09

## 2018-10-12 DIAGNOSIS — I10 ESSENTIAL (PRIMARY) HYPERTENSION: Primary | ICD-10-CM

## 2018-10-12 RX ORDER — LOSARTAN POTASSIUM AND HYDROCHLOROTHIAZIDE 12.5; 5 MG/1; MG/1
1 TABLET ORAL DAILY
Qty: 30 TABLET | Refills: 0 | Status: SHIPPED | OUTPATIENT
Start: 2018-10-12 | End: 2018-11-09

## 2018-10-12 NOTE — TELEPHONE ENCOUNTER
[facility] called requesting Refill on rx Losartan/HCT 50-12.5 MG tab, first sent on 10/6. No request found. Writer will initiate refill encounter.    Shalini Crump RN .............. 10/12/2018  7:50 AM

## 2018-10-12 NOTE — TELEPHONE ENCOUNTER
[facility] is requesting for the following:     losartan-hydrochlorothiazide (HYZAAR) 50-12.5 MG per tablet  Take 1 tablet by mouth daily  Last Written Prescription Date:  10/6/17  Last Fill Quantity: 90,   # refills: 3    Last Office Visit: 4/30/18 (annual Px was 10/6/17)  Future Office visit: None.  Angiotensin-II Receptors Nptnkh83/12 7:49 AM   Normal serum potassium on file in past 12 months     Patient due for annual exam and labs. CMP lab pending MD co-sign. Called and spoke to Patient after verifying last name and date of birth. Patient adds that she should have enough to get through all of next week. She was notified of this information and transferred to scheduling:    Next 5 appointments (look out 90 days)     Nov 09, 2018  8:30 AM CST   Office Visit with Tomi Trimble MD   [facility]    [address] [facility] [phone]                Appointment noted added in regards to needed lab for refill purposes. Prescription approved per [facility] for 30 days at this time to get Patient through until appointment.  Shalini Crump RN .............. 10/12/2018  8:56 AM

## 2018-11-09 ENCOUNTER — OFFICE VISIT (OUTPATIENT)
Dept: FAMILY MEDICINE | Facility: OTHER | Age: 83
End: 2018-11-09
Attending: FAMILY MEDICINE
Payer: MEDICARE

## 2018-11-09 VITALS
BODY MASS INDEX: 24.34 KG/M2 | DIASTOLIC BLOOD PRESSURE: 60 MMHG | HEART RATE: 80 BPM | WEIGHT: 137.4 LBS | SYSTOLIC BLOOD PRESSURE: 138 MMHG

## 2018-11-09 DIAGNOSIS — I10 ESSENTIAL (PRIMARY) HYPERTENSION: Primary | ICD-10-CM

## 2018-11-09 DIAGNOSIS — E78.00 PURE HYPERCHOLESTEROLEMIA: ICD-10-CM

## 2018-11-09 PROBLEM — M89.9 DISORDER OF BONE AND CARTILAGE: Status: RESOLVED | Noted: 2018-01-18 | Resolved: 2018-11-09

## 2018-11-09 PROBLEM — M94.9 DISORDER OF BONE AND CARTILAGE: Status: RESOLVED | Noted: 2018-01-18 | Resolved: 2018-11-09

## 2018-11-09 LAB
ALBUMIN SERPL-MCNC: 4.2 G/DL (ref 3.5–5.7)
ALP SERPL-CCNC: 61 U/L (ref 34–104)
ALT SERPL W P-5'-P-CCNC: 20 U/L (ref 7–52)
ANION GAP SERPL CALCULATED.3IONS-SCNC: 6 MMOL/L (ref 3–14)
AST SERPL W P-5'-P-CCNC: 23 U/L (ref 13–39)
BILIRUB SERPL-MCNC: 0.5 MG/DL (ref 0.3–1)
BUN SERPL-MCNC: 14 MG/DL (ref 7–25)
CALCIUM SERPL-MCNC: 9.6 MG/DL (ref 8.6–10.3)
CHLORIDE SERPL-SCNC: 100 MMOL/L (ref 98–107)
CHOLEST SERPL-MCNC: 225 MG/DL
CO2 SERPL-SCNC: 31 MMOL/L (ref 21–31)
CREAT SERPL-MCNC: 0.83 MG/DL (ref 0.6–1.2)
GFR SERPL CREATININE-BSD FRML MDRD: 65 ML/MIN/1.7M2
GLUCOSE SERPL-MCNC: 127 MG/DL (ref 70–105)
HDLC SERPL-MCNC: 44 MG/DL (ref 23–92)
LDLC SERPL CALC-MCNC: 144 MG/DL
NONHDLC SERPL-MCNC: 181 MG/DL
POTASSIUM SERPL-SCNC: 3.4 MMOL/L (ref 3.5–5.1)
PROT SERPL-MCNC: 7.3 G/DL (ref 6.4–8.9)
SODIUM SERPL-SCNC: 137 MMOL/L (ref 134–144)
TRIGL SERPL-MCNC: 186 MG/DL

## 2018-11-09 PROCEDURE — 36415 COLL VENOUS BLD VENIPUNCTURE: CPT | Performed by: FAMILY MEDICINE

## 2018-11-09 PROCEDURE — 80061 LIPID PANEL: CPT | Performed by: FAMILY MEDICINE

## 2018-11-09 PROCEDURE — 80053 COMPREHEN METABOLIC PANEL: CPT | Performed by: FAMILY MEDICINE

## 2018-11-09 PROCEDURE — G0463 HOSPITAL OUTPT CLINIC VISIT: HCPCS

## 2018-11-09 PROCEDURE — 99213 OFFICE O/P EST LOW 20 MIN: CPT | Performed by: FAMILY MEDICINE

## 2018-11-09 RX ORDER — LOSARTAN POTASSIUM AND HYDROCHLOROTHIAZIDE 12.5; 5 MG/1; MG/1
1 TABLET ORAL DAILY
Qty: 90 TABLET | Refills: 3 | Status: SHIPPED | OUTPATIENT
Start: 2018-11-09 | End: 2019-11-16

## 2018-11-09 RX ORDER — SIMVASTATIN 10 MG
10 TABLET ORAL AT BEDTIME
Qty: 90 TABLET | Refills: 3 | Status: SHIPPED | OUTPATIENT
Start: 2018-11-09 | End: 2018-11-20

## 2018-11-09 ASSESSMENT — PAIN SCALES - GENERAL: PAINLEVEL: NO PAIN (0)

## 2018-11-09 NOTE — MR AVS SNAPSHOT
After Visit Summary   11/9/2018    Leighann Girard    MRN: [id number]           Patient Information     Date Of Birth          1/9/1929        Visit Information        Provider Department      11/9/2018 8:30 AM Tomi Trimble MD [facility]        Today's Diagnoses     Essential (primary) hypertension    -  1    Pure hypercholesterolemia           Follow-ups after your visit        Who to contact     If you have questions or need follow up information about today's clinic visit or your schedule please contact [facility] directly at [phone].  Normal or non-critical lab and imaging results will be communicated to you by MyChart, letter or phone within 4 business days after the clinic has received the results. If you do not hear from us within 7 days, please contact the clinic through MyChart or phone. If you have a critical or abnormal lab result, we will notify you by phone as soon as possible.  Submit refill requests through [employer] or call your pharmacy and they will forward the refill request to us. Please allow 3 business days for your refill to be completed.          Additional Information About Your Visit        Care EveryWhere ID     This is your Care EveryWhere ID. This could be used by other organizations to access your [place] medical records  [id number]        Your Vitals Were     Pulse Breastfeeding? BMI (Body Mass Index)             80 No 24.34 kg/m2          Blood Pressure from Last 3 Encounters:   11/09/18 138/60   04/30/18 132/62   02/09/18 126/78    Weight from Last 3 Encounters:   11/09/18 137 lb 6.4 oz (62.3 kg)   04/30/18 140 lb 3.2 oz (63.6 kg)   02/09/18 135 lb (61.2 kg)              We Performed the Following     Comprehensive metabolic panel     Lipid Profile          Today's Medication Changes          These changes are accurate as of 11/9/18  8:48 AM.  If you have any questions, ask your nurse or doctor.               These medicines have changed  or have updated prescriptions.        Dose/Directions    simvastatin 10 MG tablet   Commonly known as:  ZOCOR   This may have changed:  See the new instructions.   Used for:  Pure hypercholesterolemia   Changed by:  Tomi Trimble MD        Dose:  10 mg   Take 1 tablet (10 mg) by mouth At Bedtime   Quantity:  90 tablet   Refills:  3            Where to get your medicines      These medications were sent to [facility] - [place], [place] - [address], [facility] [address]     Phone:  [phone]     losartan-hydrochlorothiazide 50-12.5 MG per tablet    simvastatin 10 MG tablet                Primary Care Provider Office Phone # Fax #    Tomi Trimble [phone]       [address] [facility] [address]        Equal Access to Services     NANCY CARLIN AH: Hadii doreen pryor hadasho Soomaali, waaxda luqadaha, qaybta kaalmada adeegyada, karley de la paz . So [facility] [phone].    ATENCIÓN: Si habla español, tiene a zeng disposición servicios gratuitos de asistencia lingüística. LlThe [facility] [phone].    We comply with applicable federal civil rights laws and [place] laws. We do not discriminate on the basis of race, color, national origin, age, disability, sex, sexual orientation, or gender identity.            Thank you!     Thank you for choosing [facility]  for your care. Our goal is always to provide you with excellent care. Hearing back from our patients is one way we can continue to improve our services. Please take a few minutes to complete the written survey that you may receive in the mail after your visit with us. Thank you!             Your Updated Medication List - Protect others around you: Learn how to safely use, store and throw away your medicines at www.disposemymeds.org.          This list is accurate as of 11/9/18  8:48 AM.  Always use your most recent med list.                   Brand Name  Dispense Instructions for use Diagnosis    Calcium Citrate-Vitamin D 500-500 MG-UNT/5GM Powd           EQL NATURAL ZINC 50 MG Tabs           losartan-hydrochlorothiazide 50-12.5 MG per tablet    HYZAAR    90 tablet    Take 1 tablet by mouth daily    Essential (primary) hypertension       MULTI-VITAMINS Tabs           Omega-3 1000 MG capsule           simvastatin 10 MG tablet    ZOCOR    90 tablet    Take 1 tablet (10 mg) by mouth At Bedtime    Pure hypercholesterolemia

## 2018-11-09 NOTE — PROGRESS NOTES
SUBJECTIVE:  Leighann Girard is a 89 year old female here for annual follow up.  She has a history of HTN and hyperlipidemia.  She has been tolerating her medication well without any side effects.  She has had a increase in her cough over the last week.  She reports that she has a chronic cough.  She has had no phlegm change.  No fevers or chills.  No shortness of breath.  She is otherwise doing well and has no other concerns today.      Patient Active Problem List    Diagnosis Date Noted     Allergic rhinitis 01/18/2018     Priority: Medium     Pure hypercholesterolemia 01/18/2018     Priority: Medium     Essential (primary) hypertension 01/18/2018     Priority: Medium     ACP (advance care planning) 12/12/2013     Priority: Medium     Cramp of limb 08/08/2011     Priority: Medium     Anemia 04/23/2009     Priority: Medium       Past Medical History:   Diagnosis Date     Hormone replacement therapy (postmenopausal)     1967,stopped estrogen replacement 11/03.     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       Past Surgical History:   Procedure Laterality Date     APPENDECTOMY OPEN      1967,Incidental     COLONOSCOPY      2009     ESOPHAGOSCOPY, GASTROSCOPY, DUODENOSCOPY (EGD), COMBINED      2009     HYSTERECTOMY TOTAL ABDOMINAL      1967,Estrogen replacement therapy until 2003     OTHER SURGICAL HISTORY      1987,[id number],BIOPSY BREAST,Right, benign       Current Outpatient Prescriptions   Medication Sig Dispense Refill     Calcium Citrate-Vitamin D 500-500 MG-UNT/5GM POWD        EQL NATURAL ZINC 50 MG TABS        losartan-hydrochlorothiazide (HYZAAR) 50-12.5 MG per tablet Take 1 tablet by mouth daily 90 tablet 3     Multiple Vitamin (MULTI-VITAMINS) TABS        Omega-3 1000 MG CAPS        simvastatin (ZOCOR) 10 MG tablet Take 1 tablet (10 mg) by mouth At Bedtime 90 tablet 3     [DISCONTINUED] losartan-hydrochlorothiazide (HYZAAR)  50-12.5 MG per tablet Take 1 tablet by mouth daily 30 tablet 0     [DISCONTINUED] simvastatin (ZOCOR) 10 MG tablet TAKE ONE TABLET BY MOUTH AT BEDTIME 90 tablet 3       Allergies:  Allergies   Allergen Reactions     Shellfish-Derived Products Shortness Of Breath     Ace Inhibitors Cough     Benzonatate Nausea     Other reaction(s): Dizziness     Fluvastatin Other (See Comments)     Elevated liver enzymes  Elevated liver enzymes     Hmg-Coa-R Inhibitors Muscle Pain (Myalgia)       Family History   Problem Relation Age of Onset     HEART DISEASE Mother      Heart Disease,Heart attack in her late 80's,      Other - See Comments Father 89     Stroke     Hyperlipidemia Sister      Hyperlipidemia, Hyperlipidemia     HEART DISEASE Sister      Heart Disease,MI in her 60's, lifelong smoker       Social History   Substance Use Topics     Smoking status: Never Smoker     Smokeless tobacco: Never Used     Alcohol use No       ROS:    As above otherwise ROS is unremarkable.      OBJECTIVE:  /60  Pulse 80  Wt 137 lb 6.4 oz (62.3 kg)  Breastfeeding? No  BMI 24.34 kg/m2    EXAM:  General Appearance: Pleasant, alert, appropriate appearance for age. No acute distress  Head: Normal. Normocephalic, atraumatic.  Eyes: PERRL, EOMI  Ears: Normal TM's bilaterally. Normal auditory canals and external ears.  Bilateral hearing aids.  OroPharynx: Dental hygiene adequate. Normal buccal mucosa. Normal pharynx.  Neck: Supple, no masses or nodes, no lymphadenopathy.  No thyromegaly.  Lungs: Normal chest wall and respirations. Clear to auscultation, no wheezes or crackles.  Cardiovascular: Regular rate and rhythm. S1, S2, no murmurs.  Musculoskeletal: No edema.  Skin: no concerning or new rashes.  Neurologic Exam: CN 2-12 grossly intact.  Normal gait.  Symmetric DTRs, No focal motor or sensory deficits. No tremor.  Psychiatric Exam: Alert and oriented, appropriate affect.    ASSESSEMENT AND PLAN:    1. Essential (primary) hypertension     2. Pure hypercholesterolemia      We will update fasting labs today.  Her medications were refilled for another year.  She is up-to-date on immunizations.    Lenny Trimble MD  Family Medicine      This document was prepared using voice generated software.  While every attempt was made for accuracy, grammatical errors may exist.

## 2018-11-09 NOTE — LETTER
November 9, 2018      Leighann Girard  [address]   [place] [address]        Dear ,    We are writing to inform you of your test results.    Your diabetes screening with fasting glucose and cholesterol panel are both elevated.  These are not high enough to warrant medication changes but I would recommend trying to improve your diet and try to get daily exercise.    Your liver and kidney testing came back normal.    Resulted Orders   Lipid Profile   Result Value Ref Range    Cholesterol 225 (H) <200 mg/dL    Triglycerides 186 (H) <150 mg/dL      Comment:      Borderline high:  150-199 mg/dl  High:             200-499 mg/dl  Very high:       >499 mg/dl      HDL Cholesterol 44 23 - 92 mg/dL    LDL Cholesterol Calculated 144 (H) <100 mg/dL      Comment:      Above desirable:  100-129 mg/dl  Borderline High:  130-159 mg/dL  High:             160-189 mg/dL  Very high:       >189 mg/dl      Non HDL Cholesterol 181 (H) <130 mg/dL      Comment:      Above Desirable:  130-159 mg/dl  Borderline high:  160-189 mg/dl  High:             190-219 mg/dl  Very high:       >219 mg/dl     Comprehensive metabolic panel   Result Value Ref Range    Sodium 137 134 - 144 mmol/L    Potassium 3.4 (L) 3.5 - 5.1 mmol/L    Chloride 100 98 - 107 mmol/L    Carbon Dioxide 31 21 - 31 mmol/L    Anion Gap 6 3 - 14 mmol/L    Glucose 127 (H) 70 - 105 mg/dL    Urea Nitrogen 14 7 - 25 mg/dL    Creatinine 0.83 0.60 - 1.20 mg/dL    GFR Estimate 65 >60 mL/min/1.7m2    GFR Estimate If Black 78 >60 mL/min/1.7m2    Calcium 9.6 8.6 - 10.3 mg/dL    Bilirubin Total 0.5 0.3 - 1.0 mg/dL    Albumin 4.2 3.5 - 5.7 g/dL    Protein Total 7.3 6.4 - 8.9 g/dL    Alkaline Phosphatase 61 34 - 104 U/L    ALT 20 7 - 52 U/L    AST 23 13 - 39 U/L       If you have any questions or concerns, please call the clinic at the number listed above.       Sincerely,        Tomi Trimble MD

## 2018-11-09 NOTE — NURSING NOTE
Patient presents today for annual medication renewal. She would also like to get fasting labs today.     Silvia Gaston LPN on 11/9/2018 at 8:23 AM

## 2018-11-20 ENCOUNTER — TELEPHONE (OUTPATIENT)
Dept: FAMILY MEDICINE | Facility: OTHER | Age: 83
End: 2018-11-20

## 2018-11-20 DIAGNOSIS — E78.00 PURE HYPERCHOLESTEROLEMIA: Primary | ICD-10-CM

## 2018-11-20 RX ORDER — SIMVASTATIN 20 MG
20 TABLET ORAL AT BEDTIME
Qty: 30 TABLET | Refills: 2 | Status: CANCELLED | OUTPATIENT
Start: 2018-11-20

## 2018-11-20 RX ORDER — SIMVASTATIN 20 MG
20 TABLET ORAL AT BEDTIME
Qty: 90 TABLET | Refills: 3 | Status: SHIPPED | OUTPATIENT
Start: 2018-11-20 | End: 2020-02-21

## 2018-11-20 NOTE — TELEPHONE ENCOUNTER
Based on her age I would recommend we start with 10 mg which may in fact not be strong enough but we should at least give it 3 months to take effect and recheck her cholesterol at that time.

## 2018-11-20 NOTE — TELEPHONE ENCOUNTER
Patient states that she has always been on the simvastatin 10mg and has been at least since 2017.  She really would like to increase to 20 mg.   Heather Gar LPN........................11/20/2018  4:31 PM

## 2019-04-02 ENCOUNTER — DOCUMENTATION ONLY (OUTPATIENT)
Dept: OTHER | Facility: CLINIC | Age: 84
End: 2019-04-02

## 2019-07-23 ENCOUNTER — OFFICE VISIT (OUTPATIENT)
Dept: FAMILY MEDICINE | Facility: OTHER | Age: 84
End: 2019-07-23
Attending: FAMILY MEDICINE
Payer: MEDICARE

## 2019-07-23 VITALS
TEMPERATURE: 98.5 F | SYSTOLIC BLOOD PRESSURE: 136 MMHG | DIASTOLIC BLOOD PRESSURE: 60 MMHG | BODY MASS INDEX: 23.91 KG/M2 | HEART RATE: 92 BPM | WEIGHT: 135 LBS

## 2019-07-23 DIAGNOSIS — L98.9 SKIN LESION: Primary | ICD-10-CM

## 2019-07-23 PROCEDURE — G0463 HOSPITAL OUTPT CLINIC VISIT: HCPCS

## 2019-07-23 PROCEDURE — 99212 OFFICE O/P EST SF 10 MIN: CPT | Performed by: FAMILY MEDICINE

## 2019-07-23 NOTE — NURSING NOTE
Patient presents today for red spot on nose x2 weeks. Patient doesn't remember scratching it or getting bit by anything.  Medication Reconciliation Complete    Silvia Gaston LPN  7/23/2019 3:21 PM

## 2019-07-23 NOTE — PROGRESS NOTES
SUBJECTIVE:  Leighann Girard is a 89 year old female here for lesion.  She developed skin lesion on tip of her nose of the last couple of weeks.  She does not have any pain, itching or other irritation with this.  She does not think she is stiff but reports that that could be a possibility.  No personal or family history of skin cancer.    ROS:    As above otherwise ROS is unremarkable.    OBJECTIVE:  /60   Pulse 92   Temp 98.5  F (36.9  C)   Wt 61.2 kg (135 lb)   BMI 23.91 kg/m      EXAM:  General Appearance: Pleasant, alert, appropriate appearance for age. No acute distress  Skin: Pinpoint area of erythema and excoriation to the tip of her nose.  No concerning findings.    ASSESSEMENT AND PLAN:    1. Skin lesion      Discussed that this appears benign.  Would watch and suspect this should heal up in the next couple weeks.  If not she will follow-up for reassessment.    Lenny Trimble MD    This document was prepared using voice generated software.  While every attempt was made for accuracy, grammatical errors may exist.

## 2019-09-09 ENCOUNTER — OFFICE VISIT (OUTPATIENT)
Dept: FAMILY MEDICINE | Facility: OTHER | Age: 84
End: 2019-09-09
Attending: FAMILY MEDICINE
Payer: COMMERCIAL

## 2019-09-09 VITALS
WEIGHT: 136 LBS | HEART RATE: 72 BPM | SYSTOLIC BLOOD PRESSURE: 136 MMHG | DIASTOLIC BLOOD PRESSURE: 64 MMHG | TEMPERATURE: 97.4 F | RESPIRATION RATE: 16 BRPM | BODY MASS INDEX: 24.1 KG/M2 | HEIGHT: 63 IN

## 2019-09-09 DIAGNOSIS — N39.41 URGE INCONTINENCE OF URINE: Primary | ICD-10-CM

## 2019-09-09 DIAGNOSIS — E78.00 PURE HYPERCHOLESTEROLEMIA: ICD-10-CM

## 2019-09-09 PROCEDURE — 99213 OFFICE O/P EST LOW 20 MIN: CPT | Performed by: FAMILY MEDICINE

## 2019-09-09 PROCEDURE — G0463 HOSPITAL OUTPT CLINIC VISIT: HCPCS

## 2019-09-09 RX ORDER — LANOLIN ALCOHOL/MO/W.PET/CERES
1000 CREAM (GRAM) TOPICAL DAILY
COMMUNITY
Start: 2019-09-09 | End: 2021-11-08

## 2019-09-09 RX ORDER — OXYBUTYNIN CHLORIDE 5 MG/1
5 TABLET, EXTENDED RELEASE ORAL DAILY
Qty: 30 TABLET | Refills: 11 | Status: SHIPPED | OUTPATIENT
Start: 2019-09-09 | End: 2020-02-24

## 2019-09-09 ASSESSMENT — PAIN SCALES - GENERAL: PAINLEVEL: NO PAIN (0)

## 2019-09-09 ASSESSMENT — MIFFLIN-ST. JEOR: SCORE: 1006.02

## 2019-09-09 NOTE — PROGRESS NOTES
"SUBJECTIVE:  Leighann Girard is a 89 year old female here for follow-up.  She reports that she has had chronic stress incontinence.  However recently she has had increasing episodes of urge incontinence.  This is becoming better she would like to discuss trying medication to help with her symptoms.  She is now having to wear a pad most days.    She also has a history of hyperlipidemia and hypertension.  She will be due for labs in November and she would like to get her labs ordered.    Allergies:  Allergies   Allergen Reactions     Shellfish-Derived Products Shortness Of Breath     Ace Inhibitors Cough     Benzonatate Nausea     Other reaction(s): Dizziness     Fluvastatin Other (See Comments)     Elevated liver enzymes  Elevated liver enzymes     Hmg-Coa-R Inhibitors Muscle Pain (Myalgia)       ROS:    As above otherwise ROS is unremarkable.    OBJECTIVE:  /64 (BP Location: Right arm, Patient Position: Sitting, Cuff Size: Adult Regular)   Pulse 72   Temp 97.4  F (36.3  C) (Tympanic)   Resp 16   Ht 1.6 m (5' 3\")   Wt 61.7 kg (136 lb)   BMI 24.09 kg/m      EXAM:  General Appearance: Pleasant, alert, appropriate appearance for age. No acute distress    ASSESSEMENT AND PLAN:    1. Urge incontinence of urine    2. Pure hypercholesterolemia      Will trial oxybutynin 5 mg daily.  Potential side effects were discussed.  She will contact me in 1 month if she is having no significant improvement would consider higher dose.    Fasting labs for November were placed she can come in on her convenience.    Lenny Trimble MD    This document was prepared using voice generated software.  While every attempt was made for accuracy, grammatical errors may exist.  "

## 2019-09-09 NOTE — NURSING NOTE
"Patient presents to clinic today for bladder leakage. She doesn't require a full undergarment, only a pad, but wonders about the \"pill they show on TV\". She also would like to have orders placed for her lipid panel to be done in November. Her Simvastatin and Losartan are due for annual refills in November also.  Medication reconciliation completed.  Park Levi CMA([facility])..................9/9/2019   9:36 AM        "

## 2019-09-25 DIAGNOSIS — E78.00 PURE HYPERCHOLESTEROLEMIA: ICD-10-CM

## 2019-09-25 LAB
ALBUMIN SERPL-MCNC: 4.2 G/DL (ref 3.5–5.7)
ALP SERPL-CCNC: 62 U/L (ref 34–104)
ALT SERPL W P-5'-P-CCNC: 9 U/L (ref 7–52)
ANION GAP SERPL CALCULATED.3IONS-SCNC: 7 MMOL/L (ref 3–14)
AST SERPL W P-5'-P-CCNC: 17 U/L (ref 13–39)
BILIRUB SERPL-MCNC: 0.7 MG/DL (ref 0.3–1)
BUN SERPL-MCNC: 18 MG/DL (ref 7–25)
CALCIUM SERPL-MCNC: 9.6 MG/DL (ref 8.6–10.3)
CHLORIDE SERPL-SCNC: 101 MMOL/L (ref 98–107)
CHOLEST SERPL-MCNC: 177 MG/DL
CO2 SERPL-SCNC: 29 MMOL/L (ref 21–31)
CREAT SERPL-MCNC: 0.91 MG/DL (ref 0.6–1.2)
GFR SERPL CREATININE-BSD FRML MDRD: 58 ML/MIN/{1.73_M2}
GLUCOSE SERPL-MCNC: 120 MG/DL (ref 70–105)
HDLC SERPL-MCNC: 50 MG/DL (ref 23–92)
LDLC SERPL CALC-MCNC: 103 MG/DL
NONHDLC SERPL-MCNC: 127 MG/DL
POTASSIUM SERPL-SCNC: 3.8 MMOL/L (ref 3.5–5.1)
PROT SERPL-MCNC: 7.6 G/DL (ref 6.4–8.9)
SODIUM SERPL-SCNC: 137 MMOL/L (ref 134–144)
TRIGL SERPL-MCNC: 119 MG/DL

## 2019-09-25 PROCEDURE — 36415 COLL VENOUS BLD VENIPUNCTURE: CPT | Mod: ZL | Performed by: FAMILY MEDICINE

## 2019-09-25 PROCEDURE — 80061 LIPID PANEL: CPT | Mod: ZL | Performed by: FAMILY MEDICINE

## 2019-09-25 PROCEDURE — 80053 COMPREHEN METABOLIC PANEL: CPT | Mod: ZL | Performed by: FAMILY MEDICINE

## 2019-10-02 ENCOUNTER — TELEPHONE (OUTPATIENT)
Dept: FAMILY MEDICINE | Facility: OTHER | Age: 84
End: 2019-10-02

## 2019-10-02 NOTE — TELEPHONE ENCOUNTER
Read patient result letter from 9/27/19.  Jolene Hahn LPN........................10/2/2019  4:01 PM

## 2019-11-16 DIAGNOSIS — I10 ESSENTIAL (PRIMARY) HYPERTENSION: ICD-10-CM

## 2019-11-19 RX ORDER — LOSARTAN POTASSIUM AND HYDROCHLOROTHIAZIDE 12.5; 5 MG/1; MG/1
TABLET ORAL
Qty: 90 TABLET | Refills: 2 | Status: SHIPPED | OUTPATIENT
Start: 2019-11-19 | End: 2020-10-27

## 2019-11-19 NOTE — TELEPHONE ENCOUNTER
"Refill request from [facility] for:  losartan-hydrochlorothiazide (HYZAAR) 50-12.5 MG tablet     LOV with PCP 9/9/2019   Lab result letter out 9/27/2019 revealed no changes to requested medication and advised to recheck in 1 year.    Will fill at this time  Requested Prescriptions   Pending Prescriptions Disp Refills     losartan-hydrochlorothiazide (HYZAAR) 50-12.5 MG tablet [Pharmacy Med Name: LOSARTAN/HCT 50-12.5MG TAB] 90 tablet 3     Sig: TAKE 1 TABLET BY MOUTH ONCE DAILY       Angiotensin-II Receptors Passed - 11/16/2019  1:00 PM        Passed - Last blood pressure under 140/90 in past 12 months     BP Readings from Last 3 Encounters:   09/09/19 136/64   07/23/19 136/60   11/09/18 138/60             Passed - Recent (12 mo) or future (30 days) visit within the authorizing provider's specialty     Patient has had an office visit with the authorizing provider or a provider within the authorizing providers department within the previous 12 mos or has a future within next 30 days. See \"Patient Info\" tab in inbasket, or \"Choose Columns\" in Meds & Orders section of the refill encounter.              Passed - Medication is active on med list        Passed - Patient is age 18 or older        Passed - No active pregnancy on record        Passed - Normal serum creatinine on file in past 12 months     Recent Labs   Lab Test 09/25/19  0904   CR 0.91             Passed - Normal serum potassium on file in past 12 months     Recent Labs   Lab Test 09/25/19  0904   POTASSIUM 3.8              Passed - No positive pregnancy test in past 12 months          Katya Magana RN  ....................  11/19/2019   12:40 PM        "

## 2019-11-22 ENCOUNTER — TELEPHONE (OUTPATIENT)
Dept: FAMILY MEDICINE | Facility: OTHER | Age: 84
End: 2019-11-22

## 2019-11-22 DIAGNOSIS — L98.9 SKIN LESION: Primary | ICD-10-CM

## 2019-11-22 NOTE — TELEPHONE ENCOUNTER
After verifying pts name and date of birth with pt, pt states she would like to see Dr. Jacobo at [facility] for her skin issues.  Susu Orozco LPN

## 2020-02-21 ENCOUNTER — TELEPHONE (OUTPATIENT)
Dept: FAMILY MEDICINE | Facility: OTHER | Age: 85
End: 2020-02-21

## 2020-02-21 DIAGNOSIS — E78.00 PURE HYPERCHOLESTEROLEMIA: ICD-10-CM

## 2020-02-21 RX ORDER — SIMVASTATIN 20 MG
20 TABLET ORAL AT BEDTIME
Qty: 90 TABLET | Refills: 1 | Status: SHIPPED | OUTPATIENT
Start: 2020-02-21 | End: 2020-05-08

## 2020-02-21 NOTE — TELEPHONE ENCOUNTER
[facility] sent Rx request for the following:   Simvastatin 20 mg  Sig:  Take one tablet (20 mg) by mouth at bedtime  Last Prescription Date:   11/20/2018  Last Fill Qty/Refills:         90, R-3    Last Office Visit:              9/9/2019-recheck lipids in 1 year  Future Office visit:           None    Routing refill request to provider for review/approval because:    Statins Protocol Passed2/21 1:58 PM   LDL on file in past 12 months    No abnormal creatine kinase in past 12 months    Recent (12 mo) or future (30 days) visit within the authorizing provider's specialty    Medication is active on med list    Patient is age 18 or older    No active pregnancy on record    No positive pregnancy test in past 12 months   Warnings Override History for simvastatin (ZOCOR) 20 MG tablet [[id number]]     Overridden by Tomi Trimble MD on Nov 20, 2018 4:40 PM   Allergy/Contraindication   1. HMG-COA-R INHIBITORS [Level: Drug Class Match] [Reason: Low risk]   2. FLUVASTATIN [Level: Drug Class Match] [Reason: Low risk]           Katya Magana RN  ....................  2/21/2020   2:00 PM

## 2020-02-21 NOTE — TELEPHONE ENCOUNTER
"Patient called to notify Dr. Trimble that she stopped taking her oxybutynin. Patients states,\"she heard that it can cause memory loss and dementia and shes not interested in taking it any longer.\" I offered her an appointment to discuss medications and she declined it at this time.     Silvia Gaston LPN on 2/21/2020 at 10:46 AM    "

## 2020-02-24 NOTE — TELEPHONE ENCOUNTER
Received refill request for Simvastatin from [facility] pharmacy. This was filled on 2/21/2020.    Prescription refused.  This is a duplicate request.  Alex Martinez RN.......2/24/2020 2:32 PM

## 2020-05-07 DIAGNOSIS — E78.00 PURE HYPERCHOLESTEROLEMIA: ICD-10-CM

## 2020-05-08 RX ORDER — SIMVASTATIN 20 MG
TABLET ORAL
Qty: 90 TABLET | Refills: 1 | Status: SHIPPED | OUTPATIENT
Start: 2020-05-08 | End: 2020-10-27

## 2020-05-08 NOTE — TELEPHONE ENCOUNTER
"Requested Prescriptions   Pending Prescriptions Disp Refills     simvastatin (ZOCOR) 20 MG tablet [Pharmacy Med Name: Simvastatin 20 MG Oral Tablet] 90 tablet 0     Sig: TAKE 1 TABLET BY MOUTH AT BEDTIME       Statins Protocol Passed - 5/7/2020  9:59 AM        Passed - LDL on file in past 12 months     Recent Labs   Lab Test 09/25/19  0904   *             Passed - No abnormal creatine kinase in past 12 months     No lab results found.             Passed - Recent (12 mo) or future (30 days) visit within the authorizing provider's specialty     Patient has had an office visit with the authorizing provider or a provider within the authorizing providers department within the previous 12 mos or has a future within next 30 days. See \"Patient Info\" tab in inbasket, or \"Choose Columns\" in Meds & Orders section of the refill encounter.              Passed - Medication is active on med list        Passed - Patient is age 18 or older        Passed - No active pregnancy on record        Passed - No positive pregnancy test in past 12 months           lov 09/09/2019  Prescription approved per [facility] Refill Protocol.    "

## 2020-10-27 DIAGNOSIS — E78.00 PURE HYPERCHOLESTEROLEMIA: ICD-10-CM

## 2020-10-27 DIAGNOSIS — I10 ESSENTIAL (PRIMARY) HYPERTENSION: Primary | ICD-10-CM

## 2020-10-27 NOTE — TELEPHONE ENCOUNTER
[id number] [place] sent Rx request for the following:      Requested Prescriptions   Pending Prescriptions Disp Refills   simvastatin (ZOCOR) 20 MG tablet [Pharmacy Med Name: Simvastatin 20 MG Oral Tablet] 90 tablet 0    Sig: TAKE 1 TABLET BY MOUTH AT BEDTIME   Last Prescription Date:   5/8/20  Last Fill Qty/Refills:         90, R-1    Routing refill request to provider for review/approval because:   Statins Protocol Failed - 10/27/2020  4:02 PM       Failed - LDL on file in past 12 months      losartan-hydrochlorothiazide (HYZAAR) 50-12.5 MG tablet [Pharmacy Med Name: Losartan Potassium-HCTZ 50-12.5 MG Oral Tablet] 90 tablet 0    Sig: Take 1 tablet by mouth once daily   Last Prescription Date:   11/19/19  Last Fill Qty/Refills:         90, R-2    Routing refill request to provider for review/approval because:   Angiotensin-II Receptors Failed - 10/27/2020  4:02 PM       Failed - Last blood pressure under 140/90 in past 12 months       Failed - Normal serum creatinine on file in past 12 months       Failed - Normal serum potassium on file in past 12 months   Diuretics (Including Combos) Protocol Failed - 10/27/2020  4:02 PM       Failed - Blood pressure under 140/90 in past 12 months       Failed - Normal serum creatinine on file in past 12 months       Failed - Normal serum potassium on file in past 12 months       Failed - Normal serum sodium on file in past 12 months     Last Office Visit:              9/9/19  Future Office visit:             Next 5 appointments (look out 90 days)    Oct 28, 2020 11:20 AM  SHORT with Yessica Meehan MD  [facility] ([facility]) [address]  [place] [address]  [phone]        Patient has appointment tomorrow. Routing to Dr. Meehan, to address at that time. Unable to complete prescription refill per RN Medication Refill Policy. Shalini Crump RN .............. 10/27/2020  4:04 PM

## 2020-10-28 ENCOUNTER — OFFICE VISIT (OUTPATIENT)
Dept: FAMILY MEDICINE | Facility: OTHER | Age: 85
End: 2020-10-28
Attending: FAMILY MEDICINE
Payer: COMMERCIAL

## 2020-10-28 VITALS
BODY MASS INDEX: 24.23 KG/M2 | DIASTOLIC BLOOD PRESSURE: 64 MMHG | TEMPERATURE: 97.6 F | HEART RATE: 94 BPM | SYSTOLIC BLOOD PRESSURE: 124 MMHG | WEIGHT: 136.8 LBS | OXYGEN SATURATION: 98 % | RESPIRATION RATE: 18 BRPM

## 2020-10-28 DIAGNOSIS — L98.9 SKIN LESION: Primary | ICD-10-CM

## 2020-10-28 PROCEDURE — 99213 OFFICE O/P EST LOW 20 MIN: CPT | Performed by: FAMILY MEDICINE

## 2020-10-28 PROCEDURE — G0463 HOSPITAL OUTPT CLINIC VISIT: HCPCS

## 2020-10-28 RX ORDER — SIMVASTATIN 20 MG
TABLET ORAL
Qty: 90 TABLET | Refills: 3 | Status: SHIPPED | OUTPATIENT
Start: 2020-10-28 | End: 2021-11-08

## 2020-10-28 RX ORDER — LOSARTAN POTASSIUM AND HYDROCHLOROTHIAZIDE 12.5; 5 MG/1; MG/1
TABLET ORAL
Qty: 90 TABLET | Refills: 3 | Status: SHIPPED | OUTPATIENT
Start: 2020-10-28 | End: 2021-11-08

## 2020-10-28 ASSESSMENT — PAIN SCALES - GENERAL: PAINLEVEL: NO PAIN (0)

## 2020-10-28 NOTE — PROGRESS NOTES
SUBJECTIVE:   Leighann Girard is a 89 year old female who presents to clinic today for the following health issues:    Patient presents with over a year of a changing/recurrent lesion on the tip of her nose.  She recalls looking at pictures from over a year ago and noticed a pimple on the tip of her nose.  Since then it comes and goes, currently is bigger with scaling.  No history of previous skin cancers.  The purpose of her appointment today was to request a referral to dermatology.        Review of Systems  See HPI, All other systems reviewed and are otherwise negative.       OBJECTIVE:     /64 (BP Location: Right arm, Patient Position: Sitting, Cuff Size: Adult Regular)   Pulse 94   Temp 97.6  F (36.4  C) (Tympanic)   Resp 18   Wt 62.1 kg (136 lb 12.8 oz)   LMP  (LMP Unknown)   SpO2 98%   BMI 24.23 kg/m    Body mass index is 24.23 kg/m .  Physical Exam  Constitutional:       Appearance: She is well-developed.   HENT:      Right Ear: External ear normal.      Left Ear: External ear normal.   Eyes:      General: No scleral icterus.     Conjunctiva/sclera: Conjunctivae normal.   Cardiovascular:      Rate and Rhythm: Normal rate.   Pulmonary:      Effort: Pulmonary effort is normal. No respiratory distress.   Skin:     Findings: No rash.      Comments: 1 cm pink scaly macule on the tip of the nose.   Neurological:      Mental Status: She is alert.         ASSESSMENT/PLAN:           ICD-10-CM    1. Skin lesion  L98.9 GENERAL SURG ADULT REFERRAL     Discussed Actinic keratosis v SCC.   Discussed option to treat with cryotherapy and monitor.   Patient requesting dermatology consult, but unwilling to travel and not wanting to wait until appointment available at [facility].   Referred to general surgery for further evaluation, possible biopsy.     Yessica Meehan MD  [facility]

## 2020-10-28 NOTE — NURSING NOTE
"Patient here for referral to derm for a little round scab on the end of nose that comes and goes, also rough skin patch on side of nose.   Heather Dang LPN ..........10/28/2020 11:32 AM   Chief Complaint   Patient presents with     Referral     derm       Initial /64 (BP Location: Right arm, Patient Position: Sitting, Cuff Size: Adult Regular)   Pulse 94   Temp 97.6  F (36.4  C) (Tympanic)   Resp 18   Wt 62.1 kg (136 lb 12.8 oz)   LMP  (LMP Unknown)   SpO2 98%   BMI 24.23 kg/m   Estimated body mass index is 24.23 kg/m  as calculated from the following:    Height as of 9/9/19: 1.6 m (5' 3\").    Weight as of this encounter: 62.1 kg (136 lb 12.8 oz).  Medication Reconciliation: complete    Heather Dang LPN    "

## 2020-11-02 ENCOUNTER — OFFICE VISIT (OUTPATIENT)
Dept: SURGERY | Facility: OTHER | Age: 85
End: 2020-11-02
Attending: FAMILY MEDICINE
Payer: MEDICARE

## 2020-11-02 VITALS
TEMPERATURE: 97.5 F | DIASTOLIC BLOOD PRESSURE: 78 MMHG | HEART RATE: 92 BPM | SYSTOLIC BLOOD PRESSURE: 134 MMHG | RESPIRATION RATE: 18 BRPM | OXYGEN SATURATION: 99 %

## 2020-11-02 DIAGNOSIS — L98.9 SKIN LESION: Primary | ICD-10-CM

## 2020-11-02 PROCEDURE — 88305 TISSUE EXAM BY PATHOLOGIST: CPT

## 2020-11-02 PROCEDURE — G0463 HOSPITAL OUTPT CLINIC VISIT: HCPCS

## 2020-11-02 PROCEDURE — 11104 PUNCH BX SKIN SINGLE LESION: CPT | Performed by: SURGERY

## 2020-11-02 ASSESSMENT — PAIN SCALES - GENERAL: PAINLEVEL: NO PAIN (0)

## 2020-11-02 NOTE — PATIENT INSTRUCTIONS
Your incision was closed with stitches that will dissolve.    It is ok to get the incision wet in the shower on the day after your procedure.     Don't soak in a tub, pool or lake for 1 week.  If you have concerns, please call.

## 2020-11-02 NOTE — PROGRESS NOTES
Procedure Note     Pre/Post Operative Diagnosis:   Nose skin lesion    Procedure:    Biopsy of nose lesion     Surgeon: KINZA Pena MD     Local Anesthesia: 1% lidocaine    Indication for the procedure:    This is a 89 year old female patient with nose skin lesion. Patient states the lesion has been present or a few years. She thinks it is getting bigger. She does not like that it is red and she states sometimes it forms a scab, which is easily brushed off. Denies bleeding. No personal history of skin cancer. Son recently underwent Mohs surgery for skin cancer of the face.   Clinically, this appears to be a ~1cm erythematous, macular lesion on the tip of the nose. Suspect actinic keratosis.   Patient states if it is an AK, she would like to treat it with a topical treatment. And avoid excision if possible.    After explaining the risks to include bleeding, infection, recurrence or need for re-excision, and scarring the patient wished to proceed.    Procedure:   The area was prepped and draped in usual sterile fashion with betadine. After adequate local anesthesia, a 2mm punch biopsy was taken from the lesion. One simple interrupted suture of 6-0 plain gut was used to close the wound and stop some oozing. The wound was dressed with steri-strips.     Plan:  The patient will be called with pathology results.  Patient will followup if there any problems with the wound including redness or drainage.      KINZA Pena MD

## 2020-11-02 NOTE — NURSING NOTE
Chief Complaint   Patient presents with     Minor Procedure     lesion on nose         Medication Reconciliation: complete    Beckie Cha, PEYTONN

## 2020-11-02 NOTE — NURSING NOTE
"Chief Complaint   Patient presents with     Minor Procedure     lesion on nose       Initial /78 (BP Location: Left arm, Patient Position: Sitting, Cuff Size: Adult Regular)   Pulse 92   Temp 97.5  F (36.4  C) (Temporal)   Resp 18   LMP  (LMP Unknown)   SpO2 99%   Breastfeeding No  Estimated body mass index is 24.23 kg/m  as calculated from the following:    Height as of 9/9/19: 1.6 m (5' 3\").    Weight as of 10/28/20: 62.1 kg (136 lb 12.8 oz).  Medication Reconciliation: complete    Shyann Pascal LPN     TIMEOUT  [facility] Protocol    A. Pre-procedure verification complete           1-relevant information / documentation available, reviewed and properly matched to the patient; 2-consent accurate and complete, 3-equipment and supplies available    B. Site marking complete     Site marked if not in continuous attendance with patient    C. TIME OUT completed     Time Out was conducted just prior to starting procedure to verify the eight required elements: 1-patient identity, 2-consent accurate and complete, 3-position, 4-correct side/site marked (if applicable), 5-procedure, 6-relevant images / results properly labeled and displayed (if applicable), 7-antibiotics / irrigation fluids (if applicable), 8-safety precautions.  "

## 2020-11-05 DIAGNOSIS — C44.311 BASAL CELL CARCINOMA OF NOSE: Primary | ICD-10-CM

## 2020-11-06 ENCOUNTER — TELEPHONE (OUTPATIENT)
Dept: SURGERY | Facility: OTHER | Age: 85
End: 2020-11-06

## 2020-11-16 ENCOUNTER — TRANSFERRED RECORDS (OUTPATIENT)
Dept: HEALTH INFORMATION MANAGEMENT | Facility: OTHER | Age: 85
End: 2020-11-16

## 2021-03-10 ENCOUNTER — TELEPHONE (OUTPATIENT)
Dept: FAMILY MEDICINE | Facility: OTHER | Age: 86
End: 2021-03-10

## 2021-03-10 NOTE — TELEPHONE ENCOUNTER
After verifying patient last name and date of birth, patient is requesting a confirmation letter for her son Wilfred Girard to have access to her medical information verbally.   Priscila Reid on 3/10/2021 at 9:37 AM

## 2021-03-24 ENCOUNTER — TELEPHONE (OUTPATIENT)
Dept: FAMILY MEDICINE | Facility: OTHER | Age: 86
End: 2021-03-24

## 2021-03-24 NOTE — TELEPHONE ENCOUNTER
She want to sign a form to allow her son to be able to obtained her medical information. Would like form mailed to her.   Heather Dang LPN ..........3/24/2021 1:30 PM

## 2021-04-06 ENCOUNTER — TELEPHONE (OUTPATIENT)
Dept: FAMILY MEDICINE | Facility: OTHER | Age: 86
End: 2021-04-06
Payer: COMMERCIAL

## 2021-04-06 DIAGNOSIS — H91.90 HEARING LOSS, UNSPECIFIED HEARING LOSS TYPE, UNSPECIFIED LATERALITY: Primary | ICD-10-CM

## 2021-04-06 NOTE — TELEPHONE ENCOUNTER
Is going to go to [facility] they told her she just needs you to fax order over- phone [phone]-she didn't have the fax

## 2021-04-07 ENCOUNTER — TRANSFERRED RECORDS (OUTPATIENT)
Dept: HEALTH INFORMATION MANAGEMENT | Facility: OTHER | Age: 86
End: 2021-04-07

## 2021-10-13 ENCOUNTER — TELEPHONE (OUTPATIENT)
Dept: FAMILY MEDICINE | Facility: OTHER | Age: 86
End: 2021-10-13

## 2021-10-13 NOTE — TELEPHONE ENCOUNTER
Please call the patient.  She would like to know if she needs a Pneumonia shot. Does she get on every year?  She also wants to get the flu shot at the same time.  Can she?  One in each arm?      Fe Alva on 10/13/2021 at 3:56 PM

## 2021-10-13 NOTE — TELEPHONE ENCOUNTER
She is up to date on her pneumonia shot and does not need any further (just one after age 65)  She should, however, get a covid booster.   She can do the covid booster and the flu shot on the same day. Please help her get these scheduled.   Yessica Meehan MD

## 2021-10-13 NOTE — TELEPHONE ENCOUNTER
Pneumo-conj  09/10/2015          Pneumo-poly  10/25/1996          Pneumo-poly  10/30/2008            Above is her shot record for the pneumonia injections.   Heather Dang LPN ..........10/13/2021 4:25 PM

## 2021-11-01 NOTE — TELEPHONE ENCOUNTER
Patient has an appt on 11/8/21 will address at that time  Heather Dang LPN ..........11/1/2021 10:41 AM

## 2021-11-08 ENCOUNTER — OFFICE VISIT (OUTPATIENT)
Dept: FAMILY MEDICINE | Facility: OTHER | Age: 86
End: 2021-11-08
Attending: FAMILY MEDICINE
Payer: COMMERCIAL

## 2021-11-08 VITALS
OXYGEN SATURATION: 99 % | SYSTOLIC BLOOD PRESSURE: 122 MMHG | RESPIRATION RATE: 18 BRPM | WEIGHT: 134 LBS | TEMPERATURE: 97.6 F | BODY MASS INDEX: 23.74 KG/M2 | HEART RATE: 83 BPM | HEIGHT: 63 IN | DIASTOLIC BLOOD PRESSURE: 62 MMHG

## 2021-11-08 DIAGNOSIS — Z00.00 ENCOUNTER FOR MEDICARE ANNUAL WELLNESS EXAM: Primary | ICD-10-CM

## 2021-11-08 DIAGNOSIS — E78.00 PURE HYPERCHOLESTEROLEMIA: ICD-10-CM

## 2021-11-08 DIAGNOSIS — I10 ESSENTIAL (PRIMARY) HYPERTENSION: ICD-10-CM

## 2021-11-08 LAB
ALBUMIN SERPL-MCNC: 4 G/DL (ref 3.5–5.7)
ALP SERPL-CCNC: 61 U/L (ref 34–104)
ALT SERPL W P-5'-P-CCNC: 13 U/L (ref 7–52)
ANION GAP SERPL CALCULATED.3IONS-SCNC: 7 MMOL/L (ref 3–14)
AST SERPL W P-5'-P-CCNC: 34 U/L (ref 13–39)
BILIRUB SERPL-MCNC: 0.5 MG/DL (ref 0.3–1)
BUN SERPL-MCNC: 19 MG/DL (ref 7–25)
CALCIUM SERPL-MCNC: 9.5 MG/DL (ref 8.6–10.3)
CHLORIDE BLD-SCNC: 101 MMOL/L (ref 98–107)
CHOLEST SERPL-MCNC: 190 MG/DL
CO2 SERPL-SCNC: 31 MMOL/L (ref 21–31)
CREAT SERPL-MCNC: 0.92 MG/DL (ref 0.6–1.2)
FASTING STATUS PATIENT QL REPORTED: ABNORMAL
GFR SERPL CREATININE-BSD FRML MDRD: 54 ML/MIN/1.73M2
GLUCOSE BLD-MCNC: 126 MG/DL (ref 70–105)
HDLC SERPL-MCNC: 44 MG/DL (ref 23–92)
LDLC SERPL CALC-MCNC: 120 MG/DL
NONHDLC SERPL-MCNC: 146 MG/DL
POTASSIUM BLD-SCNC: 3.6 MMOL/L (ref 3.5–5.1)
PROT SERPL-MCNC: 6.8 G/DL (ref 6.4–8.9)
SODIUM SERPL-SCNC: 139 MMOL/L (ref 134–144)
TRIGL SERPL-MCNC: 131 MG/DL

## 2021-11-08 PROCEDURE — G0463 HOSPITAL OUTPT CLINIC VISIT: HCPCS

## 2021-11-08 PROCEDURE — 99397 PER PM REEVAL EST PAT 65+ YR: CPT | Performed by: FAMILY MEDICINE

## 2021-11-08 PROCEDURE — 82040 ASSAY OF SERUM ALBUMIN: CPT | Mod: ZL | Performed by: FAMILY MEDICINE

## 2021-11-08 PROCEDURE — 80061 LIPID PANEL: CPT | Mod: ZL | Performed by: FAMILY MEDICINE

## 2021-11-08 PROCEDURE — 36415 COLL VENOUS BLD VENIPUNCTURE: CPT | Mod: ZL | Performed by: FAMILY MEDICINE

## 2021-11-08 RX ORDER — SIMVASTATIN 20 MG
20 TABLET ORAL AT BEDTIME
Qty: 90 TABLET | Refills: 3 | Status: SHIPPED | OUTPATIENT
Start: 2021-11-08

## 2021-11-08 RX ORDER — MULTIPLE VITAMINS W/ MINERALS TAB 9MG-400MCG
1 TAB ORAL
COMMUNITY

## 2021-11-08 RX ORDER — LOSARTAN POTASSIUM AND HYDROCHLOROTHIAZIDE 12.5; 5 MG/1; MG/1
1 TABLET ORAL DAILY
Qty: 90 TABLET | Refills: 3 | Status: SHIPPED | OUTPATIENT
Start: 2021-11-08

## 2021-11-08 ASSESSMENT — ACTIVITIES OF DAILY LIVING (ADL): CURRENT_FUNCTION: NO ASSISTANCE NEEDED

## 2021-11-08 ASSESSMENT — MIFFLIN-ST. JEOR: SCORE: 982.98

## 2021-11-08 NOTE — LETTER
November 8, 2021      Leighann Girard  [address]   [place] [address]        Dear ,    We are writing to inform you of your test results.    Your test results fall within the expected range(s) or remain unchanged from previous results.  Please continue with current treatment plan.    Resulted Orders   Lipid Panel   Result Value Ref Range    Cholesterol 190 <200 mg/dL    Triglycerides 131 <150 mg/dL    Direct Measure HDL 44 23 - 92 mg/dL    LDL Cholesterol Calculated 120 (H) <=100 mg/dL    Non HDL Cholesterol 146 (H) <130 mg/dL    Patient Fasting > 8hrs? Unknown     Narrative    Cholesterol  Desirable:  <200 mg/dL    Triglycerides  Normal:  Less than 150 mg/dL  Borderline High:  150-199 mg/dL  High:  200-499 mg/dL  Very High:  Greater than or equal to 500 mg/dL    Direct Measure HDL  Female:  Greater than or equal to 50 mg/dL   Male:  Greater than or equal to 40 mg/dL    LDL Cholesterol  Desirable:  <100mg/dL  Above Desirable:  100-129 mg/dL   Borderline High:  130-159 mg/dL   High:  160-189 mg/dL   Very High:  >= 190 mg/dL    Non HDL Cholesterol  Desirable:  130 mg/dL  Above Desirable:  130-159 mg/dL  Borderline High:  160-189 mg/dL  High:  190-219 mg/dL  Very High:  Greater than or equal to 220 mg/dL   Comprehensive Metabolic Panel   Result Value Ref Range    Sodium 139 134 - 144 mmol/L    Potassium 3.6 3.5 - 5.1 mmol/L    Chloride 101 98 - 107 mmol/L    Carbon Dioxide (CO2) 31 21 - 31 mmol/L    Anion Gap 7 3 - 14 mmol/L    Urea Nitrogen 19 7 - 25 mg/dL    Creatinine 0.92 0.60 - 1.20 mg/dL    Calcium 9.5 8.6 - 10.3 mg/dL    Glucose 126 (H) 70 - 105 mg/dL    Alkaline Phosphatase 61 34 - 104 U/L    AST 34 13 - 39 U/L    ALT 13 7 - 52 U/L    Protein Total 6.8 6.4 - 8.9 g/dL    Albumin 4.0 3.5 - 5.7 g/dL    Bilirubin Total 0.5 0.3 - 1.0 mg/dL    GFR Estimate 54 (L) >60 mL/min/1.73m2      Comment:      As of July 11, 2021, eGFR is calculated by the CKD-EPI creatinine equation, without race  adjustment. eGFR can be influenced by muscle mass, exercise, and diet. The reported eGFR is an estimation only and is only applicable if the renal function is stable.       If you have any questions or concerns, please call the clinic at the number listed above.       Sincerely,      Yessica Meehan MD

## 2021-11-08 NOTE — NURSING NOTE
"Patient here for yearly wellness visit.   Chief Complaint   Patient presents with     Wellness Visit     med review, Lab work       Initial /62 (BP Location: Right arm, Patient Position: Sitting, Cuff Size: Adult Regular)   Pulse 83   Temp 97.6  F (36.4  C) (Tympanic)   Resp 18   Ht 1.594 m (5' 2.75\")   Wt 60.8 kg (134 lb)   LMP  (LMP Unknown)   SpO2 99%   BMI 23.93 kg/m   Estimated body mass index is 23.93 kg/m  as calculated from the following:    Height as of this encounter: 1.594 m (5' 2.75\").    Weight as of this encounter: 60.8 kg (134 lb).  Medication Reconciliation: complete    Heather Dang LPN    Advance Care Directive reviewed    Heather Dang LPN ..........11/8/2021 9:28 AM   "

## 2021-11-08 NOTE — PROGRESS NOTES
"  SUBJECTIVE:   Leighann Girard is a 89 year old female who presents to clinic today for the following health issues:    Patient is here for labs and medication refills.  She has no concerns today.     Healthy Habits:     In general, how would you rate your overall health?  Good    Frequency of exercise:  2-3 days/week    Duration of exercise:  Less than 15 minutes    Do you usually eat at least 4 servings of fruit and vegetables a day, include whole grains    & fiber and avoid regularly eating high fat or \"junk\" foods?  Yes    Taking medications regularly:  Yes    Medication side effects:  None    Ability to successfully perform activities of daily living:  No assistance needed    Home Safety:  No safety concerns identified    Hearing Impairment:  No hearing concerns    In the past 6 months, have you been bothered by leaking of urine? Yes    In general, how would you rate your overall mental or emotional health?  Good      PHQ-2 Total Score: 0    Additional concerns today:  No        OBJECTIVE:     /62 (BP Location: Right arm, Patient Position: Sitting, Cuff Size: Adult Regular)   Pulse 83   Temp 97.6  F (36.4  C) (Tympanic)   Resp 18   Ht 1.594 m (5' 2.75\")   Wt 60.8 kg (134 lb)   LMP  (LMP Unknown)   SpO2 99%   BMI 23.93 kg/m    Body mass index is 23.93 kg/m .  Physical Exam  Constitutional:       Appearance: She is well-developed.   Eyes:      Conjunctiva/sclera: Conjunctivae normal.   Neck:      Thyroid: No thyromegaly.   Cardiovascular:      Rate and Rhythm: Normal rate and regular rhythm.      Heart sounds: Normal heart sounds. No murmur heard.      Pulmonary:      Effort: Pulmonary effort is normal. No respiratory distress.      Breath sounds: Normal breath sounds.   Abdominal:      Palpations: Abdomen is soft.   Musculoskeletal:      Comments: Trace LE edema   Lymphadenopathy:      Cervical: No cervical adenopathy.   Skin:     Findings: No rash.   Neurological:      Mental Status: She is alert and " oriented to person, place, and time.         ASSESSMENT/PLAN:           ICD-10-CM    1. Encounter for Medicare annual wellness exam  Z00.00    2. Essential (primary) hypertension  I10 losartan-hydrochlorothiazide (HYZAAR) 50-12.5 MG tablet     Comprehensive Metabolic Panel     Comprehensive Metabolic Panel   3. Pure hypercholesterolemia  E78.00 simvastatin (ZOCOR) 20 MG tablet     Lipid Panel     Lipid Panel     Labs today.   Medications reviewed and refilled.   Patient states she got her COVID 19 booster, will try to track down that info.       Yessica Meehan MD  [facility]

## 2021-11-08 NOTE — PATIENT INSTRUCTIONS
Patient Education   Personalized Prevention Plan  You are due for the preventive services outlined below.  Your care team is available to assist you in scheduling these services.  If you have already completed any of these items, please share that information with your care team to update in your medical record.  Health Maintenance Due   Topic Date Due     Annual Wellness Visit  Never done     FALL RISK ASSESSMENT  11/09/2019     COVID-19 Vaccine (3 - Booster for Pfizer series) 09/24/2021

## 2022-01-01 ENCOUNTER — HOSPITAL ENCOUNTER (OUTPATIENT)
Dept: CT IMAGING | Facility: OTHER | Age: 87
Discharge: HOME | End: 2022-11-11
Attending: FAMILY MEDICINE | Admitting: FAMILY MEDICINE
Payer: MEDICARE

## 2022-01-01 ENCOUNTER — MEDICAL CORRESPONDENCE (OUTPATIENT)
Dept: HEALTH INFORMATION MANAGEMENT | Facility: OTHER | Age: 87
End: 2022-01-01

## 2022-01-01 ENCOUNTER — TELEPHONE (OUTPATIENT)
Dept: FAMILY MEDICINE | Facility: OTHER | Age: 87
End: 2022-01-01

## 2022-01-01 ENCOUNTER — OFFICE VISIT (OUTPATIENT)
Dept: FAMILY MEDICINE | Facility: OTHER | Age: 87
End: 2022-01-01
Attending: FAMILY MEDICINE
Payer: COMMERCIAL

## 2022-01-01 ENCOUNTER — OFFICE VISIT (OUTPATIENT)
Dept: FAMILY MEDICINE | Facility: OTHER | Age: 87
End: 2022-01-01
Attending: FAMILY MEDICINE
Payer: MEDICARE

## 2022-01-01 ENCOUNTER — HOSPITAL ENCOUNTER (EMERGENCY)
Facility: OTHER | Age: 87
Discharge: HOME | End: 2022-10-25
Attending: STUDENT IN AN ORGANIZED HEALTH CARE EDUCATION/TRAINING PROGRAM | Admitting: STUDENT IN AN ORGANIZED HEALTH CARE EDUCATION/TRAINING PROGRAM
Payer: MEDICARE

## 2022-01-01 ENCOUNTER — PATIENT OUTREACH (OUTPATIENT)
Dept: CARE COORDINATION | Facility: CLINIC | Age: 87
End: 2022-01-01

## 2022-01-01 ENCOUNTER — APPOINTMENT (OUTPATIENT)
Dept: GENERAL RADIOLOGY | Facility: OTHER | Age: 87
End: 2022-01-01
Attending: STUDENT IN AN ORGANIZED HEALTH CARE EDUCATION/TRAINING PROGRAM
Payer: MEDICARE

## 2022-01-01 VITALS
HEART RATE: 101 BPM | BODY MASS INDEX: 23.03 KG/M2 | WEIGHT: 129 LBS | TEMPERATURE: 97 F | DIASTOLIC BLOOD PRESSURE: 59 MMHG | SYSTOLIC BLOOD PRESSURE: 128 MMHG | RESPIRATION RATE: 14 BRPM | OXYGEN SATURATION: 97 %

## 2022-01-01 VITALS
HEART RATE: 115 BPM | RESPIRATION RATE: 18 BRPM | WEIGHT: 121.4 LBS | DIASTOLIC BLOOD PRESSURE: 56 MMHG | OXYGEN SATURATION: 97 % | SYSTOLIC BLOOD PRESSURE: 132 MMHG | BODY MASS INDEX: 21.68 KG/M2

## 2022-01-01 VITALS
SYSTOLIC BLOOD PRESSURE: 128 MMHG | HEART RATE: 108 BPM | TEMPERATURE: 98.4 F | WEIGHT: 129.6 LBS | OXYGEN SATURATION: 97 % | BODY MASS INDEX: 23.14 KG/M2 | DIASTOLIC BLOOD PRESSURE: 54 MMHG | RESPIRATION RATE: 18 BRPM

## 2022-01-01 VITALS
DIASTOLIC BLOOD PRESSURE: 68 MMHG | BODY MASS INDEX: 24.18 KG/M2 | RESPIRATION RATE: 16 BRPM | HEART RATE: 108 BPM | OXYGEN SATURATION: 99 % | SYSTOLIC BLOOD PRESSURE: 128 MMHG | WEIGHT: 135.4 LBS | TEMPERATURE: 98.7 F

## 2022-01-01 DIAGNOSIS — K63.89 COLONIC MASS: Primary | ICD-10-CM

## 2022-01-01 DIAGNOSIS — E11.9 TYPE 2 DIABETES MELLITUS WITHOUT COMPLICATION, WITHOUT LONG-TERM CURRENT USE OF INSULIN (H): ICD-10-CM

## 2022-01-01 DIAGNOSIS — D50.9 IRON DEFICIENCY ANEMIA, UNSPECIFIED IRON DEFICIENCY ANEMIA TYPE: ICD-10-CM

## 2022-01-01 DIAGNOSIS — N30.00 ACUTE CYSTITIS WITHOUT HEMATURIA: ICD-10-CM

## 2022-01-01 DIAGNOSIS — D50.9 MICROCYTIC ANEMIA: ICD-10-CM

## 2022-01-01 DIAGNOSIS — D50.9 MICROCYTIC ANEMIA: Primary | ICD-10-CM

## 2022-01-01 DIAGNOSIS — R94.31 ABNORMAL ELECTROCARDIOGRAM: ICD-10-CM

## 2022-01-01 DIAGNOSIS — R73.01 IFG (IMPAIRED FASTING GLUCOSE): ICD-10-CM

## 2022-01-01 DIAGNOSIS — I10 ESSENTIAL (PRIMARY) HYPERTENSION: Primary | ICD-10-CM

## 2022-01-01 DIAGNOSIS — E83.42 HYPOMAGNESEMIA: ICD-10-CM

## 2022-01-01 DIAGNOSIS — R63.4 WEIGHT LOSS: ICD-10-CM

## 2022-01-01 DIAGNOSIS — I10 ESSENTIAL (PRIMARY) HYPERTENSION: ICD-10-CM

## 2022-01-01 DIAGNOSIS — K63.89 CECUM MASS: ICD-10-CM

## 2022-01-01 DIAGNOSIS — E11.9 TYPE 2 DIABETES MELLITUS WITHOUT COMPLICATION, WITHOUT LONG-TERM CURRENT USE OF INSULIN (H): Primary | ICD-10-CM

## 2022-01-01 DIAGNOSIS — R68.89 FEELING UNWELL: ICD-10-CM

## 2022-01-01 LAB
ALBUMIN SERPL-MCNC: 3.4 G/DL (ref 3.5–5.7)
ALBUMIN SERPL-MCNC: 3.7 G/DL (ref 3.5–5.7)
ALBUMIN UR-MCNC: 70 MG/DL
ALP SERPL-CCNC: 60 U/L (ref 34–104)
ALP SERPL-CCNC: 72 U/L (ref 34–104)
ALT SERPL W P-5'-P-CCNC: 11 U/L (ref 7–52)
ALT SERPL W P-5'-P-CCNC: 18 U/L (ref 7–52)
ANION GAP SERPL CALCULATED.3IONS-SCNC: 10 MMOL/L (ref 3–14)
ANION GAP SERPL CALCULATED.3IONS-SCNC: 6 MMOL/L (ref 3–14)
APPEARANCE UR: ABNORMAL
AST SERPL W P-5'-P-CCNC: 17 U/L (ref 13–39)
AST SERPL W P-5'-P-CCNC: 28 U/L (ref 13–39)
ATRIAL RATE - MUSE: 102 BPM
BACTERIA #/AREA URNS HPF: ABNORMAL /HPF
BACTERIA UR CULT: NORMAL
BASOPHILS # BLD AUTO: 0 10E3/UL (ref 0–0.2)
BASOPHILS NFR BLD AUTO: 0 %
BILIRUB SERPL-MCNC: 0.4 MG/DL (ref 0.3–1)
BILIRUB SERPL-MCNC: 0.5 MG/DL (ref 0.3–1)
BILIRUB UR QL STRIP: NEGATIVE
BUN SERPL-MCNC: 16 MG/DL (ref 7–25)
BUN SERPL-MCNC: 18 MG/DL (ref 7–25)
CALCIUM SERPL-MCNC: 9.4 MG/DL (ref 8.6–10.3)
CALCIUM SERPL-MCNC: 9.4 MG/DL (ref 8.6–10.3)
CHLORIDE BLD-SCNC: 102 MMOL/L (ref 98–107)
CHLORIDE BLD-SCNC: 98 MMOL/L (ref 98–107)
CHOLEST SERPL-MCNC: 181 MG/DL
CO2 SERPL-SCNC: 27 MMOL/L (ref 21–31)
CO2 SERPL-SCNC: 29 MMOL/L (ref 21–31)
COLOR UR AUTO: YELLOW
CREAT SERPL-MCNC: 0.82 MG/DL (ref 0.6–1.2)
CREAT SERPL-MCNC: 0.87 MG/DL (ref 0.6–1.2)
CREAT UR-MCNC: 305 MG/DL
DIASTOLIC BLOOD PRESSURE - MUSE: NORMAL MMHG
EOSINOPHIL # BLD AUTO: 0 10E3/UL (ref 0–0.7)
EOSINOPHIL NFR BLD AUTO: 0 %
ERYTHROCYTE [DISTWIDTH] IN BLOOD BY AUTOMATED COUNT: 15.5 % (ref 10–15)
ERYTHROCYTE [DISTWIDTH] IN BLOOD BY AUTOMATED COUNT: 15.7 % (ref 10–15)
ERYTHROCYTE [DISTWIDTH] IN BLOOD BY AUTOMATED COUNT: 16.1 % (ref 10–15)
ERYTHROCYTE [DISTWIDTH] IN BLOOD BY AUTOMATED COUNT: 21.3 % (ref 10–15)
FASTING STATUS PATIENT QL REPORTED: NO
FERRITIN SERPL-MCNC: 6 NG/ML (ref 24–336)
GFR SERPL CREATININE-BSD FRML MDRD: 62 ML/MIN/1.73M2
GFR SERPL CREATININE-BSD FRML MDRD: 66 ML/MIN/1.73M2
GLUCOSE BLD-MCNC: 136 MG/DL (ref 70–105)
GLUCOSE BLD-MCNC: 211 MG/DL (ref 70–105)
GLUCOSE UR STRIP-MCNC: NEGATIVE MG/DL
HBA1C MFR BLD: 7.3 % (ref 4–6.2)
HBA1C MFR BLD: 7.6 % (ref 4–6.2)
HCT VFR BLD AUTO: 29 % (ref 35–47)
HCT VFR BLD AUTO: 32.8 % (ref 35–47)
HCT VFR BLD AUTO: 33.1 % (ref 35–47)
HCT VFR BLD AUTO: 33.6 % (ref 35–47)
HDLC SERPL-MCNC: 40 MG/DL (ref 23–92)
HGB BLD-MCNC: 10.3 G/DL (ref 11.7–15.7)
HGB BLD-MCNC: 10.4 G/DL (ref 11.7–15.7)
HGB BLD-MCNC: 10.5 G/DL (ref 11.7–15.7)
HGB BLD-MCNC: 8.8 G/DL (ref 11.7–15.7)
HGB UR QL STRIP: NEGATIVE
HOLD SPECIMEN: NORMAL
HYALINE CASTS: 37 /LPF
IMM GRANULOCYTES # BLD: 0 10E3/UL
IMM GRANULOCYTES NFR BLD: 0 %
INTERPRETATION ECG - MUSE: NORMAL
IRON SATN MFR SERPL: 3 % (ref 20–55)
IRON SERPL-MCNC: 13 UG/DL (ref 50–212)
KETONES UR STRIP-MCNC: 60 MG/DL
LACTATE SERPL-SCNC: 2 MMOL/L (ref 0.7–2)
LDLC SERPL CALC-MCNC: 105 MG/DL
LEUKOCYTE ESTERASE UR QL STRIP: ABNORMAL
LIPASE SERPL-CCNC: 10 U/L (ref 11–82)
LYMPHOCYTES # BLD AUTO: 1 10E3/UL (ref 0.8–5.3)
LYMPHOCYTES NFR BLD AUTO: 10 %
MAGNESIUM SERPL-MCNC: 1.7 MG/DL (ref 1.9–2.7)
MAGNESIUM SERPL-MCNC: 1.8 MG/DL (ref 1.9–2.7)
MAGNESIUM SERPL-MCNC: 2 MG/DL (ref 1.9–2.7)
MCH RBC QN AUTO: 22.2 PG (ref 26.5–33)
MCH RBC QN AUTO: 23.5 PG (ref 26.5–33)
MCH RBC QN AUTO: 25 PG (ref 26.5–33)
MCH RBC QN AUTO: 25.1 PG (ref 26.5–33)
MCHC RBC AUTO-ENTMCNC: 30.3 G/DL (ref 31.5–36.5)
MCHC RBC AUTO-ENTMCNC: 30.7 G/DL (ref 31.5–36.5)
MCHC RBC AUTO-ENTMCNC: 31.7 G/DL (ref 31.5–36.5)
MCHC RBC AUTO-ENTMCNC: 31.7 G/DL (ref 31.5–36.5)
MCV RBC AUTO: 73 FL (ref 78–100)
MCV RBC AUTO: 77 FL (ref 78–100)
MCV RBC AUTO: 79 FL (ref 78–100)
MCV RBC AUTO: 79 FL (ref 78–100)
MICROALBUMIN UR-MCNC: 281 MG/L
MICROALBUMIN/CREAT UR: 92.13 MG/G CR (ref 0–25)
MONOCYTES # BLD AUTO: 0.7 10E3/UL (ref 0–1.3)
MONOCYTES NFR BLD AUTO: 7 %
MUCOUS THREADS #/AREA URNS LPF: PRESENT /LPF
NEUTROPHILS # BLD AUTO: 8.5 10E3/UL (ref 1.6–8.3)
NEUTROPHILS NFR BLD AUTO: 83 %
NITRATE UR QL: NEGATIVE
NONHDLC SERPL-MCNC: 141 MG/DL
NRBC # BLD AUTO: 0 10E3/UL
NRBC BLD AUTO-RTO: 0 /100
NT-PROBNP SERPL-MCNC: 97 PG/ML (ref 0–100)
P AXIS - MUSE: 62 DEGREES
PH UR STRIP: 5.5 [PH] (ref 5–9)
PLATELET # BLD AUTO: 290 10E3/UL (ref 150–450)
PLATELET # BLD AUTO: 291 10E3/UL (ref 150–450)
PLATELET # BLD AUTO: 319 10E3/UL (ref 150–450)
PLATELET # BLD AUTO: 368 10E3/UL (ref 150–450)
POTASSIUM BLD-SCNC: 3.3 MMOL/L (ref 3.5–5.1)
POTASSIUM BLD-SCNC: 3.9 MMOL/L (ref 3.5–5.1)
PR INTERVAL - MUSE: 202 MS
PROT SERPL-MCNC: 6.4 G/DL (ref 6.4–8.9)
PROT SERPL-MCNC: 6.6 G/DL (ref 6.4–8.9)
QRS DURATION - MUSE: 88 MS
QT - MUSE: 356 MS
QTC - MUSE: 463 MS
R AXIS - MUSE: -10 DEGREES
RBC # BLD AUTO: 3.97 10E6/UL (ref 3.8–5.2)
RBC # BLD AUTO: 4.16 10E6/UL (ref 3.8–5.2)
RBC # BLD AUTO: 4.18 10E6/UL (ref 3.8–5.2)
RBC # BLD AUTO: 4.39 10E6/UL (ref 3.8–5.2)
RBC URINE: 2 /HPF
RENAL TUB EPI: <1 /HPF
RETICS # AUTO: 0.06 10E6/UL (ref 0.03–0.1)
RETICS/RBC NFR AUTO: 1.4 % (ref 0.5–2)
SODIUM SERPL-SCNC: 135 MMOL/L (ref 134–144)
SODIUM SERPL-SCNC: 137 MMOL/L (ref 134–144)
SP GR UR STRIP: 1.02 (ref 1–1.03)
SQUAMOUS EPITHELIAL: 3 /HPF
SYSTOLIC BLOOD PRESSURE - MUSE: NORMAL MMHG
T AXIS - MUSE: 15 DEGREES
TIBC SERPL-MCNC: 442.4 UG/DL (ref 245–400)
TRANSFERRIN SERPL-MCNC: 316 MG/DL (ref 203–362)
TRANSITIONAL EPI: 1 /HPF
TRIGL SERPL-MCNC: 181 MG/DL
TROPONIN I SERPL-MCNC: 12.9 PG/ML (ref 0–34)
UIBC (UNSATURATED): 429.4 MG/DL
UROBILINOGEN UR STRIP-MCNC: NORMAL MG/DL
VENTRICULAR RATE- MUSE: 102 BPM
VIT B12 SERPL-MCNC: 530 PG/ML (ref 180–914)
WBC # BLD AUTO: 10.3 10E3/UL (ref 4–11)
WBC # BLD AUTO: 8.1 10E3/UL (ref 4–11)
WBC # BLD AUTO: 8.2 10E3/UL (ref 4–11)
WBC # BLD AUTO: 9.2 10E3/UL (ref 4–11)
WBC URINE: 13 /HPF

## 2022-01-01 PROCEDURE — 83036 HEMOGLOBIN GLYCOSYLATED A1C: CPT | Mod: ZL | Performed by: FAMILY MEDICINE

## 2022-01-01 PROCEDURE — G0463 HOSPITAL OUTPT CLINIC VISIT: HCPCS

## 2022-01-01 PROCEDURE — 36415 COLL VENOUS BLD VENIPUNCTURE: CPT | Mod: ZL | Performed by: FAMILY MEDICINE

## 2022-01-01 PROCEDURE — 83735 ASSAY OF MAGNESIUM: CPT | Mod: ZL | Performed by: FAMILY MEDICINE

## 2022-01-01 PROCEDURE — 80053 COMPREHEN METABOLIC PANEL: CPT | Performed by: STUDENT IN AN ORGANIZED HEALTH CARE EDUCATION/TRAINING PROGRAM

## 2022-01-01 PROCEDURE — 83880 ASSAY OF NATRIURETIC PEPTIDE: CPT | Performed by: STUDENT IN AN ORGANIZED HEALTH CARE EDUCATION/TRAINING PROGRAM

## 2022-01-01 PROCEDURE — 93010 ELECTROCARDIOGRAM REPORT: CPT | Performed by: INTERNAL MEDICINE

## 2022-01-01 PROCEDURE — 93005 ELECTROCARDIOGRAM TRACING: CPT | Performed by: STUDENT IN AN ORGANIZED HEALTH CARE EDUCATION/TRAINING PROGRAM

## 2022-01-01 PROCEDURE — 84484 ASSAY OF TROPONIN QUANT: CPT | Performed by: STUDENT IN AN ORGANIZED HEALTH CARE EDUCATION/TRAINING PROGRAM

## 2022-01-01 PROCEDURE — 85027 COMPLETE CBC AUTOMATED: CPT | Mod: ZL | Performed by: FAMILY MEDICINE

## 2022-01-01 PROCEDURE — 83605 ASSAY OF LACTIC ACID: CPT | Performed by: STUDENT IN AN ORGANIZED HEALTH CARE EDUCATION/TRAINING PROGRAM

## 2022-01-01 PROCEDURE — 74177 CT ABD & PELVIS W/CONTRAST: CPT | Mod: MG

## 2022-01-01 PROCEDURE — 87086 URINE CULTURE/COLONY COUNT: CPT | Performed by: STUDENT IN AN ORGANIZED HEALTH CARE EDUCATION/TRAINING PROGRAM

## 2022-01-01 PROCEDURE — 99283 EMERGENCY DEPT VISIT LOW MDM: CPT | Performed by: STUDENT IN AN ORGANIZED HEALTH CARE EDUCATION/TRAINING PROGRAM

## 2022-01-01 PROCEDURE — 80061 LIPID PANEL: CPT | Mod: ZL | Performed by: FAMILY MEDICINE

## 2022-01-01 PROCEDURE — 99214 OFFICE O/P EST MOD 30 MIN: CPT | Performed by: FAMILY MEDICINE

## 2022-01-01 PROCEDURE — 250N000013 HC RX MED GY IP 250 OP 250 PS 637: Performed by: STUDENT IN AN ORGANIZED HEALTH CARE EDUCATION/TRAINING PROGRAM

## 2022-01-01 PROCEDURE — 81003 URINALYSIS AUTO W/O SCOPE: CPT | Performed by: STUDENT IN AN ORGANIZED HEALTH CARE EDUCATION/TRAINING PROGRAM

## 2022-01-01 PROCEDURE — 82043 UR ALBUMIN QUANTITATIVE: CPT | Mod: ZL | Performed by: FAMILY MEDICINE

## 2022-01-01 PROCEDURE — 99213 OFFICE O/P EST LOW 20 MIN: CPT | Performed by: FAMILY MEDICINE

## 2022-01-01 PROCEDURE — 83735 ASSAY OF MAGNESIUM: CPT | Performed by: STUDENT IN AN ORGANIZED HEALTH CARE EDUCATION/TRAINING PROGRAM

## 2022-01-01 PROCEDURE — 82728 ASSAY OF FERRITIN: CPT | Mod: ZL | Performed by: FAMILY MEDICINE

## 2022-01-01 PROCEDURE — 71045 X-RAY EXAM CHEST 1 VIEW: CPT

## 2022-01-01 PROCEDURE — 85025 COMPLETE CBC W/AUTO DIFF WBC: CPT | Performed by: STUDENT IN AN ORGANIZED HEALTH CARE EDUCATION/TRAINING PROGRAM

## 2022-01-01 PROCEDURE — 85014 HEMATOCRIT: CPT | Mod: ZL | Performed by: FAMILY MEDICINE

## 2022-01-01 PROCEDURE — 99285 EMERGENCY DEPT VISIT HI MDM: CPT | Mod: 25 | Performed by: STUDENT IN AN ORGANIZED HEALTH CARE EDUCATION/TRAINING PROGRAM

## 2022-01-01 PROCEDURE — 250N000011 HC RX IP 250 OP 636: Performed by: FAMILY MEDICINE

## 2022-01-01 PROCEDURE — 83690 ASSAY OF LIPASE: CPT | Performed by: STUDENT IN AN ORGANIZED HEALTH CARE EDUCATION/TRAINING PROGRAM

## 2022-01-01 PROCEDURE — 82607 VITAMIN B-12: CPT | Mod: ZL | Performed by: FAMILY MEDICINE

## 2022-01-01 PROCEDURE — 83550 IRON BINDING TEST: CPT | Mod: ZL | Performed by: FAMILY MEDICINE

## 2022-01-01 PROCEDURE — 80053 COMPREHEN METABOLIC PANEL: CPT | Mod: ZL | Performed by: FAMILY MEDICINE

## 2022-01-01 PROCEDURE — 36415 COLL VENOUS BLD VENIPUNCTURE: CPT | Performed by: STUDENT IN AN ORGANIZED HEALTH CARE EDUCATION/TRAINING PROGRAM

## 2022-01-01 PROCEDURE — 85045 AUTOMATED RETICULOCYTE COUNT: CPT | Mod: ZL | Performed by: FAMILY MEDICINE

## 2022-01-01 RX ORDER — LORAZEPAM 2 MG/ML
1 CONCENTRATE ORAL EVERY 4 HOURS PRN
Qty: 30 ML | Refills: 1 | Status: SHIPPED | OUTPATIENT
Start: 2022-01-01

## 2022-01-01 RX ORDER — IOPAMIDOL 755 MG/ML
70 INJECTION, SOLUTION INTRAVASCULAR ONCE
Status: COMPLETED | OUTPATIENT
Start: 2022-01-01 | End: 2022-01-01

## 2022-01-01 RX ORDER — FERROUS SULFATE 325(65) MG
325 TABLET, DELAYED RELEASE (ENTERIC COATED) ORAL 2 TIMES DAILY
Qty: 60 TABLET | Refills: 1 | Status: SHIPPED | OUTPATIENT
Start: 2022-01-01

## 2022-01-01 RX ORDER — MORPHINE SULFATE 100 MG/5ML
5 SOLUTION ORAL
Qty: 30 ML | Refills: 0 | Status: SHIPPED | OUTPATIENT
Start: 2022-01-01

## 2022-01-01 RX ORDER — METOPROLOL TARTRATE 25 MG/1
25 TABLET, FILM COATED ORAL ONCE
Status: DISCONTINUED | OUTPATIENT
Start: 2022-01-01 | End: 2022-01-01

## 2022-01-01 RX ORDER — CEFDINIR 300 MG/1
300 CAPSULE ORAL 2 TIMES DAILY
Qty: 10 CAPSULE | Refills: 0 | Status: SHIPPED | OUTPATIENT
Start: 2022-01-01 | End: 2022-01-01

## 2022-01-01 RX ORDER — PANTOPRAZOLE SODIUM 40 MG/1
40 TABLET, DELAYED RELEASE ORAL DAILY
Qty: 30 TABLET | Refills: 0 | Status: SHIPPED | OUTPATIENT
Start: 2022-01-01 | End: 2022-01-01

## 2022-01-01 RX ORDER — PANTOPRAZOLE SODIUM 40 MG/1
40 TABLET, DELAYED RELEASE ORAL DAILY
Qty: 30 TABLET | Refills: 0 | Status: SHIPPED | OUTPATIENT
Start: 2022-01-01

## 2022-01-01 RX ORDER — MAGNESIUM OXIDE 400 MG/1
400 TABLET ORAL ONCE
Status: COMPLETED | OUTPATIENT
Start: 2022-01-01 | End: 2022-01-01

## 2022-01-01 RX ADMIN — IOPAMIDOL 70 ML: 755 INJECTION, SOLUTION INTRAVENOUS at 14:01

## 2022-01-01 RX ADMIN — MAGNESIUM OXIDE TAB 400 MG (241.3 MG ELEMENTAL MG) 400 MG: 400 (241.3 MG) TAB at 09:34

## 2022-01-01 ASSESSMENT — ACTIVITIES OF DAILY LIVING (ADL): ADLS_ACUITY_SCORE: 35

## 2022-01-01 ASSESSMENT — PAIN SCALES - GENERAL
PAINLEVEL: NO PAIN (0)

## 2022-01-01 ASSESSMENT — PATIENT HEALTH QUESTIONNAIRE - PHQ9
10. IF YOU CHECKED OFF ANY PROBLEMS, HOW DIFFICULT HAVE THESE PROBLEMS MADE IT FOR YOU TO DO YOUR WORK, TAKE CARE OF THINGS AT HOME, OR GET ALONG WITH OTHER PEOPLE: NOT DIFFICULT AT ALL
SUM OF ALL RESPONSES TO PHQ QUESTIONS 1-9: 1
SUM OF ALL RESPONSES TO PHQ QUESTIONS 1-9: 1

## 2022-07-27 NOTE — NURSING NOTE
"Chief Complaint   Patient presents with     Consult     Back pain and cramps in ankles.       Initial /68 (BP Location: Right arm, Patient Position: Sitting, Cuff Size: Adult Regular)   Pulse 108   Temp 98.7  F (37.1  C) (Tympanic)   Resp 16   Wt 61.4 kg (135 lb 6.4 oz)   LMP  (LMP Unknown)   SpO2 99%   BMI 24.18 kg/m   Estimated body mass index is 24.18 kg/m  as calculated from the following:    Height as of 11/8/21: 1.594 m (5' 2.75\").    Weight as of this encounter: 61.4 kg (135 lb 6.4 oz).  Medication Reconciliation: complete    Heather Dang LPN    Advance Care Directive reviewed    "

## 2022-07-27 NOTE — PROGRESS NOTES
ICD-10-CM    1. Essential (primary) hypertension  I10 Comprehensive Metabolic Panel     CBC W PLT No Diff     Magnesium     Comprehensive Metabolic Panel     CBC W PLT No Diff     Magnesium   2. Microcytic anemia  D50.9 Iron binding panel     Ferritin     Vitamin B12     Reticulocytes   3. IFG (impaired fasting glucose)  R73.01 Hemoglobin A1c      Discussed with patient that it is normal to have some stiffness and joint pain upon waking in the morning.  The fact that she feels better by the time she gets to the bathroom is actually quite remarkable.  She is encouraged to continue with regular activity. Could consider tylenol for joint pains.     Did recommend lab work-up.  Microcytic anemia is identified after patient left the clinic.  Will add some additional lab work.  Patient is currently stable. Will contact patient with additional lab results and discuss recommendations at that time.           Nicholas Lawton is a 89 year oldpresenting for the following health issues:  Consult (Back pain and cramps in ankles.)      History of Present Illness       Reason for visit:  Back pain cramps in ankles    She eats 0-1 servings of fruits and vegetables daily.She consumes 0 sweetened beverage(s) daily.She exercises with enough effort to increase her heart rate 9 or less minutes per day.  She exercises with enough effort to increase her heart rate 3 or less days per week.   She is taking medications regularly.     Notes back pain when she first gets up in the morning. By the time she gets to the BR it's better and doesn't bother her the rest of the day. Doesn't bother her at night.     She had been noticing more cramps in her legs, but states that this has resolved over the past several days.    She at times feels a band of tension around her head, this is also resolved.  No visual changes.    She has had some more swelling in her ankles, does not wear compression stockings.  Declines being measured for these as she  states that she would not wear them.    Occasionally when she leans forward she feels a little bit dizzy, but then once she stands up she feels okay.    Wonders if she is sleeping too much.  Usually goes to bed at 11, gets up at 930.  She takes a nap most days.  She states that she probably would not sleep this much if she lived with someone else.  Limited social interaction.  Her son lives locally, she has 1 daughter that lives in [place].          Objective    /68 (BP Location: Right arm, Patient Position: Sitting, Cuff Size: Adult Regular)   Pulse 108   Temp 98.7  F (37.1  C) (Tympanic)   Resp 16   Wt 61.4 kg (135 lb 6.4 oz)   LMP  (LMP Unknown)   SpO2 99%   BMI 24.18 kg/m    Body mass index is 24.18 kg/m .  Physical Exam   GENERAL: healthy, alert and no distress  NECK: no adenopathy, no asymmetry, masses, or scars and thyroid normal to palpation  RESP: lungs clear to auscultation - no rales, rhonchi or wheezes  CV: Tachycardic initially, improved by end of exam.  Rhythm normal. no murmur appreciated  MS: no gross musculoskeletal defects noted, 1+ pitting edema noted.    Results for orders placed or performed in visit on 07/27/22   Extra Tube     Status: None    Narrative    The following orders were created for panel order Extra Tube.  Procedure                               Abnormality         Status                     ---------                               -----------         ------                     Extra Serum Separator Tu...[[id number]]                      Final result                 Please view results for these tests on the individual orders.   Extra Serum Separator Tube (SST)     Status: None   Result Value Ref Range    Hold Specimen [facility]    Comprehensive Metabolic Panel     Status: Abnormal   Result Value Ref Range    Sodium 137 134 - 144 mmol/L    Potassium 3.9 3.5 - 5.1 mmol/L    Chloride 102 98 - 107 mmol/L    Carbon Dioxide (CO2) 29 21 - 31 mmol/L    Anion Gap 6 3 - 14 mmol/L    Urea  Nitrogen 18 7 - 25 mg/dL    Creatinine 0.87 0.60 - 1.20 mg/dL    Calcium 9.4 8.6 - 10.3 mg/dL    Glucose 136 (H) 70 - 105 mg/dL    Alkaline Phosphatase 60 34 - 104 U/L    AST 28 13 - 39 U/L    ALT 18 7 - 52 U/L    Protein Total 6.4 6.4 - 8.9 g/dL    Albumin 3.7 3.5 - 5.7 g/dL    Bilirubin Total 0.4 0.3 - 1.0 mg/dL    GFR Estimate 62 >60 mL/min/1.73m2   CBC W PLT No Diff     Status: Abnormal   Result Value Ref Range    WBC Count 8.1 4.0 - 11.0 10e3/uL    RBC Count 3.97 3.80 - 5.20 10e6/uL    Hemoglobin 8.8 (L) 11.7 - 15.7 g/dL    Hematocrit 29.0 (L) 35.0 - 47.0 %    MCV 73 (L) 78 - 100 fL    MCH 22.2 (L) 26.5 - 33.0 pg    MCHC 30.3 (L) 31.5 - 36.5 g/dL    RDW 15.7 (H) 10.0 - 15.0 %    Platelet Count 290 150 - 450 10e3/uL   Magnesium     Status: Normal   Result Value Ref Range    Magnesium 2.0 1.9 - 2.7 mg/dL

## 2022-08-31 PROBLEM — E10.9 DIABETES MELLITUS TYPE 1 (H): Status: ACTIVE | Noted: 2022-01-01

## 2022-08-31 PROBLEM — E10.9 DIABETES MELLITUS TYPE 1 (H): Status: RESOLVED | Noted: 2022-01-01 | Resolved: 2022-01-01

## 2022-08-31 PROBLEM — E11.9 DIABETES MELLITUS, TYPE 2 (H): Status: ACTIVE | Noted: 2022-01-01

## 2022-08-31 NOTE — NURSING NOTE
"Chief Complaint   Patient presents with     Results     HEre for test results       Initial /54 (BP Location: Right arm, Patient Position: Sitting, Cuff Size: Adult Regular)   Pulse 108   Temp 98.4  F (36.9  C) (Tympanic)   Resp 18   Wt 58.8 kg (129 lb 9.6 oz)   LMP  (LMP Unknown)   SpO2 97%   BMI 23.14 kg/m   Estimated body mass index is 23.14 kg/m  as calculated from the following:    Height as of 11/8/21: 1.594 m (5' 2.75\").    Weight as of this encounter: 58.8 kg (129 lb 9.6 oz).  Medication Reconciliation: complete    Heather Dang LPN    Advance Care Directive reviewed    "

## 2022-08-31 NOTE — PROGRESS NOTES
Assessment & Plan       ICD-10-CM    1. Microcytic anemia  D50.9 CBC W PLT No Diff     CBC W PLT No Diff   2. Type 2 diabetes mellitus without complication, without long-term current use of insulin (H)  E11.9    3. Iron deficiency anemia, unspecified iron deficiency anemia type  D50.9 pantoprazole (PROTONIX) 40 MG EC tablet     Patient Instructions   Continue Protonix for another month then stop.  Continue iron for another 2 months.   Follow up with me in 2 months for updated labs.   We still don't know for sure why your hemoglobin/iron were low. Normally we would proceed with an upper and lower endscopy (EGD and colonoscopy) to rule out an ulcer or a cancer. Let me know if you would like to do this. But I do think it would be reasonable to continue to monitor your labs closely.     We also discussed your new diagnosis of diabetes based on lab work done last time. This is not in the range where I would recommend starting medications. We will keep an eye on it for now. It sounds like you eat just a small amount of sweets. It is fine to continue to do this from a quality of life perspective.       No follow-ups on file.    Yessica Meehan MD  [facility] hospitals   Leighann is a 89 year old, presenting for the following health issues:  Results (HEre for test results)      History of Present Illness       Reason for visit:  Results    She eats 2-3 servings of fruits and vegetables daily.She consumes 0 sweetened beverage(s) daily.   She is taking medications regularly.    Today's PHQ-9         PHQ-9 Total Score: 1    PHQ-9 Q9 Thoughts of better off dead/self-harm past 2 weeks :   Not at all    How difficult have these problems made it for you to do your work, take care of things at home, or get along with other people: Not difficult at all       Here for follow up.   Recently diagnosed with microcytic anemia, with evidence of iron deficiency.   Opted for conservative management. Due to age and risk  Bladder Cancer   WHAT YOU NEED TO KNOW:   Bladder cancer starts in the cells that line your bladder  DISCHARGE INSTRUCTIONS:   Call 911 for any of the following:   · You suddenly feel lightheaded and short of breath  · You cough up blood  Seek care immediately if:   · Your arm or leg feels warm, tender, and painful  It may look swollen and red  · You are unable to urinate  Contact your healthcare provider or oncologist if:   · You have a fever  · You vomit and cannot keep any liquids or food down  · You have new or worsening pain  · Your pain gets worse or does not go away after you take pain medicine  · You have questions or concerns about your condition or care  Self-care:   · Do not smoke  Nicotine can damage blood vessels and make it hard to manage your bladder cancer  Smoking also increases your risk for new or returning cancer  Ask your healthcare provider for information if you currently smoke and need help to quit  E-cigarettes or smokeless tobacco still contain nicotine  Talk to your healthcare provider before you use these products  · Limit or do not drink alcohol  Alcohol may cause you to become dehydrated  Ask your oncologist if it is safe for you to drink alcohol, and how much is safe to drink  · Eat healthy foods  Healthy foods include fruit, vegetables, whole-grain breads, low-fat dairy products, beans, lean meats, and fish  Your healthcare provider may recommend that you eat less red meat  You need to eat enough calories to help prevent weight loss and increase your energy level  You also need protein to give you strength  If you do not feel hungry, eat small amounts often instead of large meals  · Drink liquids as directed  You may need to drink more liquids than usual to prevent dehydration  Ask how much liquid to drink each day and which liquids are best for you  · Exercise as directed  Exercise may help increase your energy level and appetite  Ask your healthcare provider how much exercise you need and which exercises are best for you  For more information and support: It may be difficult for you and your family to go through cancer and cancer treatments  Join a support group or talk with others who have gone through treatment  · [address]  [place] , [place] [address]  Phone: [phone]  Web Address: http://[employer]/  [employer]  · [address] [place] [address], [address] [place] [address]  Phone: [phone]  Web Address: http://[employer]/  gov  Follow up with your healthcare provider or oncologist as directed: You will need to see your oncologist for ongoing treatment  Write down your questions so you remember to ask them during your visits  © 2017 [address] [place] [address] Information is for End User's use only and may not be sold, redistributed or otherwise used for commercial purposes  All illustrations and images included in CareNotes® are the copyrighted property of A D A M [employer] Inc  or [employer]  The above information is an  only  It is not intended as medical advice for individual conditions or treatments  Talk to your doctor, nurse or pharmacist before following any medical regimen to see if it is safe and effective for you  factors, EGD/Colonosocpy not done.   Patient taking PPI, iron, holding ASA and NSAIDs.   Feeling good.           Objective    /54 (BP Location: Right arm, Patient Position: Sitting, Cuff Size: Adult Regular)   Pulse 108   Temp 98.4  F (36.9  C) (Tympanic)   Resp 18   Wt 58.8 kg (129 lb 9.6 oz)   LMP  (LMP Unknown)   SpO2 97%   BMI 23.14 kg/m    Body mass index is 23.14 kg/m .  Physical Exam  Constitutional:       Appearance: She is well-developed.   HENT:      Right Ear: External ear normal.      Left Ear: External ear normal.   Eyes:      General: No scleral icterus.     Conjunctiva/sclera: Conjunctivae normal.   Cardiovascular:      Rate and Rhythm: Normal rate.   Pulmonary:      Effort: Pulmonary effort is normal. No respiratory distress.   Skin:     Findings: No rash.   Neurological:      Mental Status: She is alert.            Results for orders placed or performed in visit on 08/31/22   CBC W PLT No Diff     Status: Abnormal   Result Value Ref Range    WBC Count 8.2 4.0 - 11.0 10e3/uL    RBC Count 4.39 3.80 - 5.20 10e6/uL    Hemoglobin 10.3 (L) 11.7 - 15.7 g/dL    Hematocrit 33.6 (L) 35.0 - 47.0 %    MCV 77 (L) 78 - 100 fL    MCH 23.5 (L) 26.5 - 33.0 pg    MCHC 30.7 (L) 31.5 - 36.5 g/dL    RDW 21.3 (H) 10.0 - 15.0 %    Platelet Count 291 150 - 450 10e3/uL   Extra Tube     Status: None (In process)    Narrative    The following orders were created for panel order Extra Tube.  Procedure                               Abnormality         Status                     ---------                               -----------         ------                     Extra Serum Separator Tu...[[id number]]                      In process                   Please view results for these tests on the individual orders.

## 2022-08-31 NOTE — PATIENT INSTRUCTIONS
Continue Protonix for another month then stop.  Continue iron for another 2 months.   Follow up with me in 2 months for updated labs.   We still don't know for sure why your hemoglobin/iron were low. Normally we would proceed with an upper and lower endscopy (EGD and colonoscopy) to rule out an ulcer or a cancer. Let me know if you would like to do this. But I do think it would be reasonable to continue to monitor your labs closely.     We also discussed your new diagnosis of diabetes based on lab work done last time. This is not in the range where I would recommend starting medications. We will keep an eye on it for now. It sounds like you eat just a small amount of sweets. It is fine to continue to do this from a quality of life perspective.

## 2022-10-25 NOTE — ED TRIAGE NOTES
"Pt comes into the ER today with her son. She lives at home alone. She reports that since about 7pm last night she started to feel \"sick\". She cannot explain it. She does not feel weak, short of breath, or have pain. Her stomach kept her up but she doesn't have pain, nausea. She did vomit once this morning.      Triage Assessment     Row Name 10/25/22 0748       Triage Assessment (Adult)    Airway WDL WDL       Respiratory WDL    Respiratory WDL WDL       Skin Circulation/Temperature WDL    Skin Circulation/Temperature WDL WDL       Cardiac WDL    Cardiac WDL WDL       Peripheral/Neurovascular WDL    Peripheral Neurovascular WDL WDL       Cognitive/Neuro/Behavioral WDL    Cognitive/Neuro/Behavioral WDL WDL              "

## 2022-10-25 NOTE — ED NOTES
She took a meclizine this morning. She thought maybe she was getting dizzy. She is not dizzy now.

## 2022-10-25 NOTE — DISCHARGE INSTRUCTIONS
Urine sample is concerning for early UTI. Please complete 5-day antibiotic prescription. Recommend daily yogurt or probiotic while taking antibiotics to avoid potential antibiotic side effects including diarrhea.     If you do not start to improve after a full 2 days of taking your antibiotics or have not fully recovered after completing your antibiotic prescription, please follow up with a primary care provider.     If you develop runny nose, congestion, cough, sore throat, nausea, body aches - please get tested for COVID.    Please review attached instructions including reasons to return to the emergency department.

## 2022-10-25 NOTE — ED PROVIDER NOTES
History     Chief Complaint   Patient presents with     Not feeling well       Leighann Girard is a 89 year old female who presents with son for not feeling well.  7 PM last evening started not feeling well.  Unable to further characterize any symptoms associate with not feeling well.  1 episode of nonbloody emesis.  Noticed right lower quadrant bump overnight which has seemed to gone away.  Denies any recent medication changes except for iron discontinuation.  Denies denies any symptoms beyond not feeling well including fever, chills, new dyspnea beyond her chronic dyspnea, cough, any pain, dysuria, diarrhea, blood in stool, constipation.  Denies recent exertional dyspnea.  Her son is wondering if her heart is the cause.  Last UTI approximately 40 years ago and was associated with dysuria    Allergies   Allergen Reactions     Shellfish-Derived Products Shortness Of Breath     Ace Inhibitors Cough     Benzonatate Nausea     Other reaction(s): Dizziness     Fluvastatin Other (See Comments)     Elevated liver enzymes  Elevated liver enzymes     Hmg-Coa-R Inhibitors Muscle Pain (Myalgia)       Patient Active Problem List    Diagnosis Date Noted     Diabetes mellitus, type 2 (H) 08/31/2022     Priority: Medium     Diagnosed 7/2022, goal A1C <8. No treatment planned at this time. Unless A1C increases, dietary changes not recommended and blood sugar monitoring not planned. Yessica Meehan MD        Allergic rhinitis 01/18/2018     Priority: Medium     Pure hypercholesterolemia 01/18/2018     Priority: Medium     Essential (primary) hypertension 01/18/2018     Priority: Medium     Cramp of limb 08/08/2011     Priority: Medium     Anemia 04/23/2009     Priority: Medium       Past Medical History:   Diagnosis Date     Hormone replacement therapy (postmenopausal)      Other specified postprocedural states      Retinal hemorrhage        Past Surgical History:   Procedure Laterality Date     APPENDECTOMY OPEN      1967,Incidental      COLONOSCOPY      2009     ESOPHAGOSCOPY, GASTROSCOPY, DUODENOSCOPY (EGD), COMBINED      2009     HYSTERECTOMY TOTAL ABDOMINAL      ,Estrogen replacement therapy until      OTHER SURGICAL HISTORY      ,BIOPSY BREAST,Right, benign       Family History   Problem Relation Age of Onset     Heart Disease Mother         Heart Disease,Heart attack in her late 80's,      Other - See Comments Father 89        Stroke     Hyperlipidemia Sister         Hyperlipidemia, Hyperlipidemia     Heart Disease Sister         Heart Disease,MI in her 60's, lifelong smoker       Social History     Tobacco Use     Smoking status: Never     Smokeless tobacco: Never   Substance Use Topics     Alcohol use: No     Drug use: Never     Comment: Drug use: No       Medications:    cefdinir (OMNICEF) 300 MG capsule  ferrous sulfate (FE TABS) 325 (65 Fe) MG EC tablet  losartan-hydrochlorothiazide (HYZAAR) 50-12.5 MG tablet  multivitamin w/minerals (THERA-VIT-M) tablet  pantoprazole (PROTONIX) 40 MG EC tablet  simvastatin (ZOCOR) 20 MG tablet        Review of Systems: See HPI for pertinent negatives and positives. All other systems reviewed and found to be negative.    Physical Exam   /61   Pulse 117   Temp 97  F (36.1  C) (Tympanic)   Resp 14   Wt 58.5 kg (129 lb)   LMP  (LMP Unknown)   SpO2 94%   BMI 23.03 kg/m       General: awake, comfortable  HEENT: atraumatic  Respiratory: normal effort, clear to auscultation bilaterally  Cardiovascular: regular rate and rhythm, no murmurs, radial pulses symmetric 2+  Abdomen: soft, nondistended, nontender  Extremities: no deformities, edema, or tenderness  Skin: warm, dry, no rashes  Neuro: alert, normal interaction, no focal deficits  Psych: appropriate mood and affect    ED Course      ED Course as of 10/25/22 1021   Tue Oct 25, 2022   0843 EKG: Sinus tachycardia 102, no ST deviations, left bundle branch block, abnormal T wave inversion. Rate increased by 10 versus prior  but otherwise appears stable versus prior.       Results for orders placed or performed during the hospital encounter of 10/25/22 (from the past 24 hour(s))   CBC with platelets differential    Narrative    The following orders were created for panel order CBC with platelets differential.  Procedure                               Abnormality         Status                     ---------                               -----------         ------                     CBC with platelets and d...[[id number]]  Abnormal            Final result                 Please view results for these tests on the individual orders.   Comprehensive metabolic panel   Result Value Ref Range    Sodium 135 134 - 144 mmol/L    Potassium 3.3 (L) 3.5 - 5.1 mmol/L    Chloride 98 98 - 107 mmol/L    Carbon Dioxide (CO2) 27 21 - 31 mmol/L    Anion Gap 10 3 - 14 mmol/L    Urea Nitrogen 16 7 - 25 mg/dL    Creatinine 0.82 0.60 - 1.20 mg/dL    Calcium 9.4 8.6 - 10.3 mg/dL    Glucose 211 (H) 70 - 105 mg/dL    Alkaline Phosphatase 72 34 - 104 U/L    AST 17 13 - 39 U/L    ALT 11 7 - 52 U/L    Protein Total 6.6 6.4 - 8.9 g/dL    Albumin 3.4 (L) 3.5 - 5.7 g/dL    Bilirubin Total 0.5 0.3 - 1.0 mg/dL    GFR Estimate 66 >60 mL/min/1.73m2   Lactic acid whole blood   Result Value Ref Range    Lactic Acid 2.0 0.7 - 2.0 mmol/L   Magnesium   Result Value Ref Range    Magnesium 1.7 (L) 1.9 - 2.7 mg/dL   Troponin I   Result Value Ref Range    Troponin I 12.9 0.0 - 34.0 pg/mL   Lipase   Result Value Ref Range    Lipase 10 (L) 11 - 82 U/L   Extra Tube ([place] Draw)    Narrative    The following orders were created for panel order Extra Tube ([place] Draw).  Procedure                               Abnormality         Status                     ---------                               -----------         ------                     Extra Blue Top Tube[[id number]]                              Final result               Extra Serum Separator Tu...[[id number]]                       Final result                 Please view results for these tests on the individual orders.   Extra Blue Top Tube   Result Value Ref Range    Hold Specimen JIC    Extra Serum Separator Tube (SST)   Result Value Ref Range    Hold Specimen JIC    CBC with platelets and differential   Result Value Ref Range    WBC Count 10.3 4.0 - 11.0 10e3/uL    RBC Count 4.16 3.80 - 5.20 10e6/uL    Hemoglobin 10.4 (L) 11.7 - 15.7 g/dL    Hematocrit 32.8 (L) 35.0 - 47.0 %    MCV 79 78 - 100 fL    MCH 25.0 (L) 26.5 - 33.0 pg    MCHC 31.7 31.5 - 36.5 g/dL    RDW 16.1 (H) 10.0 - 15.0 %    Platelet Count 319 150 - 450 10e3/uL    % Neutrophils 83 %    % Lymphocytes 10 %    % Monocytes 7 %    % Eosinophils 0 %    % Basophils 0 %    % Immature Granulocytes 0 %    NRBCs per 100 WBC 0 <1 /100    Absolute Neutrophils 8.5 (H) 1.6 - 8.3 10e3/uL    Absolute Lymphocytes 1.0 0.8 - 5.3 10e3/uL    Absolute Monocytes 0.7 0.0 - 1.3 10e3/uL    Absolute Eosinophils 0.0 0.0 - 0.7 10e3/uL    Absolute Basophils 0.0 0.0 - 0.2 10e3/uL    Absolute Immature Granulocytes 0.0 <=0.4 10e3/uL    Absolute NRBCs 0.0 10e3/uL   Nt probnp inpatient (BNP)   Result Value Ref Range    N terminal Pro BNP Inpatient 97 0 - 100 pg/mL   XR Chest Port 1 View    Narrative    PROCEDURE:  XR CHEST PORT 1 VIEW    HISTORY: not well feeling, chronic sob. .    COMPARISON:  6/3/2016    FINDINGS:    The cardiomediastinal contours are magnified by technique. There is  calcific aortic atherosclerosis.   No focal consolidation, effusion or pneumothorax.      Impression    IMPRESSION:  Stable portable chest.      GLENYS ROSALES MD         SYSTEM ID:  [id number]   UA with Microscopic reflex to Culture    Specimen: Urine, Clean Catch   Result Value Ref Range    Color Urine Yellow Colorless, Straw, Light Yellow, Yellow    Appearance Urine Slightly Cloudy (A) Clear    Glucose Urine Negative Negative mg/dL    Bilirubin Urine Negative Negative    Ketones Urine 60 (A) Negative mg/dL    Specific  Gravity Urine 1.023 1.000 - 1.030    Blood Urine Negative Negative    pH Urine 5.5 5.0 - 9.0    Protein Albumin Urine 70 (A) Negative mg/dL    Urobilinogen Urine Normal Normal, 2.0 mg/dL    Nitrite Urine Negative Negative    Leukocyte Esterase Urine Moderate (A) Negative    Bacteria Urine Few (A) None Seen /HPF    Mucus Urine Present (A) None Seen /LPF    RBC Urine 2 <=2 /HPF    WBC Urine 13 (H) <=5 /HPF    Squamous Epithelials Urine 3 (H) <=1 /HPF    Transitional Epithelials Urine 1 <=1 /HPF    Renal Tubular Epithelials Urine <1 None Seen /HPF    Hyaline Casts Urine 37 (H) <=2 /LPF    Narrative    Urine Culture ordered based on laboratory criteria       Medications   magnesium oxide (MAG-OX) tablet 400 mg (400 mg Oral Given 10/25/22 0934)       Assessments & Plan (with Medical Decision Making)     I have reviewed the nursing notes.    89 year old female evaluated for feeling unwell starting last evening.  Evaluation remarkable for possible urinary tract infection with moderate LE in UA.  Discussed with patient and son risks and benefits of empiric antibiotic course, ultimately choosing to pursue this due to potential risks of worsening condition, confusion, fall risk.  Patient does live independently.  If symptoms worsen despite being on antibiotics, this may represent an early viral infection.  Discharged home with attached instructions on diagnosis including ED return precautions.    I have reviewed the findings, diagnosis, plan, and need for any follow up with the patient.    Patient instructions:   Urine sample is concerning for early UTI. Please complete 5-day antibiotic prescription. Recommend daily yogurt or probiotic while taking antibiotics to avoid potential antibiotic side effects including diarrhea.     If you do not start to improve after a full 2 days of taking your antibiotics or have not fully recovered after completing your antibiotic prescription, please follow up with a primary care provider.      If you develop runny nose, congestion, cough, sore throat, nausea, body aches - please get tested for COVID.    Please review attached instructions including reasons to return to the emergency department.    New Prescriptions    CEFDINIR (OMNICEF) 300 MG CAPSULE    Take 1 capsule (300 mg) by mouth 2 times daily for 5 days       Final diagnoses:   Feeling unwell   Acute cystitis without hematuria       10/25/2022   [facility]     Talat Mendez MD  10/25/22 1028

## 2022-10-26 NOTE — RESULT ENCOUNTER NOTE
Final urine culture report is negative.  Adult Negative Urine culture parameters per protocol: Any # Urogenital single or mixed organism, <10,000 col/ml single organism (cath/midstream), and > 3 organisms (No susceptibilities performed).  [facility] Emergency Dept discharge antibiotic prescribed (If applicable): Cefdinir  Treatment recommendations per [facility] ED Lab Result Urine Culture protocol.

## 2022-10-31 NOTE — TELEPHONE ENCOUNTER
If she is still taking the antibiotic she can stop.   If she is still having symptoms/concerns, may need to be seen.     Yessica Meehan MD

## 2022-10-31 NOTE — TELEPHONE ENCOUNTER
Talked with Son, she is done with antibiotics.  She still feels sick even though she can't explain what or how she is feeling sick.   She has an appt with [facility] on Wednesday 11/2.   Heather Dang LPN ..........10/31/2022 1:08 PM

## 2022-10-31 NOTE — TELEPHONE ENCOUNTER
Patient was seen for a UTI in ER about a week ago and said they have never received the results of her tests. They can not get on [employer] and would like a call      Diana Silverio on 10/31/2022 at 12:30 PM

## 2022-10-31 NOTE — TELEPHONE ENCOUNTER
Looks like UC came back negative, If she is still on the antibiotics should she continue?   Heather Dang LPN ..........10/31/2022 12:58 PM

## 2022-11-02 NOTE — TELEPHONE ENCOUNTER
Can't get echo until Dec.21st how concerned are you as should they find some place else. Please call

## 2022-11-02 NOTE — PROGRESS NOTES
Assessment & Plan       ICD-10-CM    1. Type 2 diabetes mellitus without complication, without long-term current use of insulin (H)  E11.9 Hemoglobin A1c     Lipid Panel     Albumin Random Urine Quantitative with Creat Ratio     Hemoglobin A1c     Lipid Panel      2. Weight loss  R63.4 CT Chest Abdomen Pelvis w/o & w Contrast      3. Iron deficiency anemia, unspecified iron deficiency anemia type  D50.9 CBC W PLT No Diff     CBC W PLT No Diff      4. Essential (primary) hypertension  I10       5. Hypomagnesemia  E83.42 Magnesium     Magnesium      6. Abnormal electrocardiogram  R94.31 Echocardiogram Complete        Would recommend repeat labs today.  Due to unintentional weight loss, recommend CT of the chest abdomen and pelvis.  Recommend echo due to possibility of recent cardiac event.  Could consider stress test, but will defer for now.  Recommend daily calorie supplement shake, twice daily if able to tolerate.  Further recommendations based on above results.      No follow-ups on file.    Yessica Meehan MD  [facility]    Nicholas Lawton is a 89 year old accompanied by her son, presenting for the following health issues:  RECHECK (Medication check. Also was in ER a week ago feeling sick. Did a bunch of tests and found nothing. She couldn't really say what didn't feel well. States stopped taking her Iron a month ago cause it made her feel sick. )      History of Present Illness       Reason for visit:  Check up on iron and discuss not feeling well for a week    She eats 0-1 servings of fruits and vegetables daily.She consumes 0 sweetened beverage(s) daily.She exercises with enough effort to increase her heart rate 10 to 19 minutes per day.  She exercises with enough effort to increase her heart rate 3 or less days per week.   She is taking medications regularly.       Presents for follow-up after recent ED visit.  She just does not feel quite right, but she is unable to identify any  "specific symptoms.    Earlier this year she was diagnosed with microscopic anemia.  Hemoglobin improved with iron and PPI.  Due to age, work-up was deferred at that time.  She stopped her iron about 2 months ago and hemoglobin has remained stable, slightly reduced.    She was also diagnosed with diabetes at that time.  No treatments were started, she has not been monitoring regularly.  Fasting glucose at presentation to ED was 211.    She was also found to have mild hypomagnesemia and hypokalemia.    EKG done with possible changes related to previous infarct.    Son is present with her today.  Says that she typically eats \"like a bird, but now she is eating like half a bird.\"    Review of weights reveals a 14 pound weight loss in the past 3 months.    She sometimes feels like she is going to be dizzy, but never actually feels dizzy.  She is having no abdominal discomfort or stool changes.  She is not throwing up.  She does not really feel weak.  She reads 3-4 books per week, has continued to do this.  She feels like she is sleeping well.    She states she did have some lower extremity swelling at some point, but this seems to have resolved on its own.        Objective    /56   Pulse 115   Resp 18   Wt 55.1 kg (121 lb 6.4 oz)   LMP  (LMP Unknown)   SpO2 97%   BMI 21.68 kg/m    Body mass index is 21.68 kg/m .  Physical Exam  Constitutional:       Appearance: She is well-developed and well-nourished.   HENT:        Comments: TMs appear normal. Eyes:      Conjunctiva/sclera: Conjunctivae normal.   Neck:      Thyroid: No thyromegaly.   Cardiovascular:      Rate and Rhythm: Normal rate and regular rhythm.      Heart sounds: Normal heart sounds. No murmur heard.  Pulmonary:      Effort: Pulmonary effort is normal. No respiratory distress.      Breath sounds: Normal breath sounds.   Musculoskeletal:         General: No swelling or edema.   Lymphadenopathy:      Cervical: No cervical adenopathy.   Skin:     " Findings: No rash.   Neurological:      Mental Status: She is alert and oriented to person, place, and time.   Psychiatric:         Mood and Affect: Mood and affect normal.

## 2022-11-02 NOTE — TELEPHONE ENCOUNTER
[phone] [facility] called They just scheduled one for Dec 2nd, Given the phone # [phone] to schedule to Son. They will call and see what they have.   Heather Dang LPN ..........11/2/2022 4:41 PM

## 2022-11-02 NOTE — NURSING NOTE
"Chief Complaint   Patient presents with     RECHECK     Medication check. Also was in ER a week ago feeling sick. Did a bunch of tests and found nothing. She couldn't really say what didn't feel well. States stopped taking her Iron a month ago cause it made her feel sick.        Initial /56   Pulse 115   Resp 18   Wt 55.1 kg (121 lb 6.4 oz)   LMP  (LMP Unknown)   SpO2 97%   BMI 21.68 kg/m   Estimated body mass index is 21.68 kg/m  as calculated from the following:    Height as of 11/8/21: 1.594 m (5' 2.75\").    Weight as of this encounter: 55.1 kg (121 lb 6.4 oz).  Medication Reconciliation: complete    Heather Dang LPN    Advance Care Directive reviewed    "

## 2022-11-14 NOTE — TELEPHONE ENCOUNTER
Patients son, Wilfred, called and stated his mother is feeling sick, has loss her appetite, and sleeps a lot. Her last glucose reading was 210. He would like Leighann to be worked into the schedule today for a telephone appointment to discuss her recent blood test results. Okay to leave detailed message. Please advise.   Giulia Reyes on 11/14/2022 at 7:30 AM

## 2022-11-14 NOTE — TELEPHONE ENCOUNTER
Son called to review CT results (before I saw this message). He will be talking with his sister and we plan to talk again later this afternoon. Yessica Meehan MD

## 2022-11-15 NOTE — PROGRESS NOTES
Clinic Care Coordination Contact    Clinic Care Coordination Contact  OUTREACH    Referral Information: Referral from Dr. Meehan, Patient with new likely metastatic colon cancer based on imaging. Thinking about hospice. Please contact son (renetta) or daughter (arlene) to discuss and determine if hospice referral needed. Yessica Meehan MD    11/15/22 Spoke with Daughter Arlene and Son Renetta, they request meeting CC in person at clinic to discuss hospice further. Renetta will call arlene to coordinate best time and day, and will let CC know.  Daughter extended her stay with her mom and will be staying through Dec, flight is booked for Dec 29th.     Awaiting return call.

## 2022-11-16 NOTE — TELEPHONE ENCOUNTER
Called and spoke with son, Joseph.  Joseph states that mom has been diagnosed with advanced colon cancer and they will not be doing any treatment.    Joseph states they have a care conference today with Jolene to talk about hospice.    States patient is having abdominal pain which in mild-moderate pain right now.  States patient does not have anything for pain and wondering what they could do for her.  States patient is in bed and does not want to eat.    Informed son will route message to Dr. Meehan for review and consideration.    Elly Whelan RN on 11/16/2022 at 10:32 AM

## 2022-11-16 NOTE — TELEPHONE ENCOUNTER
Prescriptions sent to [facility] for morphine (pain) and ativan (agitation/anxiety). Can someone check with [facility] to make sure they received them and there will not be issues filling. See if son/daughter have questions about use.     Yessica Meehan MD

## 2022-11-16 NOTE — TELEPHONE ENCOUNTER
Patient/Son called and just doing a reminder that she needs something today-uses [facility]---I let know you are aware and would send a reminder

## 2022-11-16 NOTE — TELEPHONE ENCOUNTER
Pt was diagnosed with advanced colon cancer.  There is not going to be any treatment.  She is experiencing intense abdominal pain and they would like to get a prescription for pain.  Please call.    Nathan Leigh on 11/16/2022 at 10:15 AM

## 2022-11-16 NOTE — PROGRESS NOTES
Clinic Care Coordination Contact    Met with Son, and Daughter Kira via telephone call. Patient would like to move forward with hospice and is asking children to coordinate services.  Discussed and educated about hospice, provided names of hospice agencies in the area. Kira to  pamphlets and consent to communicate form from clinic today. Will notify writer of agency they would like to go through for referral later today.     Jolene Vazquez RN on 11/16/2022 at 1:22 PM         Update: Family electing to move forward with [facility]. Writer confirmed [facility] will be admitting on 11/17/22 at 1300.

## 2023-01-01 ENCOUNTER — TELEPHONE (OUTPATIENT)
Dept: FAMILY MEDICINE | Facility: OTHER | Age: 88
End: 2023-01-01
Payer: COMMERCIAL

## 2023-01-01 ENCOUNTER — HEALTH MAINTENANCE LETTER (OUTPATIENT)
Age: 88
End: 2023-01-01

## 2023-01-01 DIAGNOSIS — K63.89 MALAKOPLAKIA OF ILEUM: Primary | ICD-10-CM

## 2023-01-11 NOTE — TELEPHONE ENCOUNTER
Dr Meehan reviewed and completed the following home care or hospice form(s) for Patient on1/11/23   This covers the certification period effective 11/17/22 to 2/14/23.  Heather Dang LPN on 1/11/2023 at 2:35 PM

## (undated) RX ORDER — MAGNESIUM OXIDE 400 MG/1
TABLET ORAL
Status: DISPENSED
Start: 2022-01-01